# Patient Record
Sex: FEMALE | Race: WHITE | HISPANIC OR LATINO | Employment: UNEMPLOYED | URBAN - METROPOLITAN AREA
[De-identification: names, ages, dates, MRNs, and addresses within clinical notes are randomized per-mention and may not be internally consistent; named-entity substitution may affect disease eponyms.]

---

## 2017-09-13 ENCOUNTER — NON-PROVIDER VISIT (OUTPATIENT)
Dept: URGENT CARE | Facility: PHYSICIAN GROUP | Age: 37
End: 2017-09-13

## 2017-09-13 DIAGNOSIS — Z02.1 PRE-EMPLOYMENT DRUG SCREENING: ICD-10-CM

## 2017-09-13 LAB
AMP AMPHETAMINE: NORMAL
COC COCAINE: NORMAL
INT CON NEG: NEGATIVE
INT CON POS: POSITIVE
MET METHAMPHETAMINES: NORMAL
OPI OPIATES: NORMAL
PCP PHENCYCLIDINE: NORMAL
POC DRUG COMMENT 753798-OCCUPATIONAL HEALTH: NORMAL
THC: NORMAL

## 2017-09-13 PROCEDURE — 80305 DRUG TEST PRSMV DIR OPT OBS: CPT | Performed by: PHYSICIAN ASSISTANT

## 2017-11-03 ENCOUNTER — HOSPITAL ENCOUNTER (EMERGENCY)
Facility: MEDICAL CENTER | Age: 37
End: 2017-11-03
Attending: EMERGENCY MEDICINE
Payer: MEDICAID

## 2017-11-03 ENCOUNTER — HOSPITAL ENCOUNTER (EMERGENCY)
Facility: MEDICAL CENTER | Age: 37
End: 2017-11-03
Payer: MEDICAID

## 2017-11-03 ENCOUNTER — APPOINTMENT (OUTPATIENT)
Dept: RADIOLOGY | Facility: MEDICAL CENTER | Age: 37
End: 2017-11-03
Payer: MEDICAID

## 2017-11-03 ENCOUNTER — APPOINTMENT (OUTPATIENT)
Dept: RADIOLOGY | Facility: MEDICAL CENTER | Age: 37
End: 2017-11-03
Attending: EMERGENCY MEDICINE
Payer: MEDICAID

## 2017-11-03 VITALS
HEIGHT: 62 IN | SYSTOLIC BLOOD PRESSURE: 134 MMHG | WEIGHT: 154.54 LBS | OXYGEN SATURATION: 99 % | RESPIRATION RATE: 18 BRPM | BODY MASS INDEX: 28.44 KG/M2 | TEMPERATURE: 96.7 F | HEART RATE: 87 BPM | DIASTOLIC BLOOD PRESSURE: 80 MMHG

## 2017-11-03 VITALS
TEMPERATURE: 97.2 F | SYSTOLIC BLOOD PRESSURE: 124 MMHG | HEART RATE: 89 BPM | WEIGHT: 154.54 LBS | RESPIRATION RATE: 14 BRPM | BODY MASS INDEX: 28.27 KG/M2 | DIASTOLIC BLOOD PRESSURE: 76 MMHG | OXYGEN SATURATION: 100 %

## 2017-11-03 DIAGNOSIS — M25.512 ACUTE PAIN OF LEFT SHOULDER: ICD-10-CM

## 2017-11-03 LAB
ALBUMIN SERPL BCP-MCNC: 4 G/DL (ref 3.2–4.9)
ALBUMIN/GLOB SERPL: 1.3 G/DL
ALP SERPL-CCNC: 76 U/L (ref 30–99)
ALT SERPL-CCNC: 7 U/L (ref 2–50)
ANION GAP SERPL CALC-SCNC: 8 MMOL/L (ref 0–11.9)
APTT PPP: 24.1 SEC (ref 24.7–36)
AST SERPL-CCNC: 9 U/L (ref 12–45)
BASOPHILS # BLD AUTO: 0.6 % (ref 0–1.8)
BASOPHILS # BLD: 0.04 K/UL (ref 0–0.12)
BILIRUB SERPL-MCNC: 0.5 MG/DL (ref 0.1–1.5)
BNP SERPL-MCNC: <2 PG/ML (ref 0–100)
BUN SERPL-MCNC: 12 MG/DL (ref 8–22)
CALCIUM SERPL-MCNC: 9.4 MG/DL (ref 8.5–10.5)
CHLORIDE SERPL-SCNC: 100 MMOL/L (ref 96–112)
CO2 SERPL-SCNC: 26 MMOL/L (ref 20–33)
CREAT SERPL-MCNC: 0.61 MG/DL (ref 0.5–1.4)
EKG IMPRESSION: NORMAL
EKG IMPRESSION: NORMAL
EOSINOPHIL # BLD AUTO: 0.09 K/UL (ref 0–0.51)
EOSINOPHIL NFR BLD: 1.4 % (ref 0–6.9)
ERYTHROCYTE [DISTWIDTH] IN BLOOD BY AUTOMATED COUNT: 39.2 FL (ref 35.9–50)
GFR SERPL CREATININE-BSD FRML MDRD: >60 ML/MIN/1.73 M 2
GLOBULIN SER CALC-MCNC: 3.1 G/DL (ref 1.9–3.5)
GLUCOSE SERPL-MCNC: 427 MG/DL (ref 65–99)
HCT VFR BLD AUTO: 45.4 % (ref 37–47)
HGB BLD-MCNC: 16.3 G/DL (ref 12–16)
IMM GRANULOCYTES # BLD AUTO: 0.01 K/UL (ref 0–0.11)
IMM GRANULOCYTES NFR BLD AUTO: 0.2 % (ref 0–0.9)
INR PPP: 1 (ref 0.87–1.13)
LIPASE SERPL-CCNC: 33 U/L (ref 11–82)
LYMPHOCYTES # BLD AUTO: 1.79 K/UL (ref 1–4.8)
LYMPHOCYTES NFR BLD: 27.3 % (ref 22–41)
MCH RBC QN AUTO: 30.2 PG (ref 27–33)
MCHC RBC AUTO-ENTMCNC: 35.9 G/DL (ref 33.6–35)
MCV RBC AUTO: 84.1 FL (ref 81.4–97.8)
MONOCYTES # BLD AUTO: 0.29 K/UL (ref 0–0.85)
MONOCYTES NFR BLD AUTO: 4.4 % (ref 0–13.4)
NEUTROPHILS # BLD AUTO: 4.34 K/UL (ref 2–7.15)
NEUTROPHILS NFR BLD: 66.1 % (ref 44–72)
NRBC # BLD AUTO: 0 K/UL
NRBC BLD AUTO-RTO: 0 /100 WBC
PLATELET # BLD AUTO: 276 K/UL (ref 164–446)
PMV BLD AUTO: 11.5 FL (ref 9–12.9)
POTASSIUM SERPL-SCNC: 3.8 MMOL/L (ref 3.6–5.5)
PROT SERPL-MCNC: 7.1 G/DL (ref 6–8.2)
PROTHROMBIN TIME: 12.9 SEC (ref 12–14.6)
RBC # BLD AUTO: 5.4 M/UL (ref 4.2–5.4)
SODIUM SERPL-SCNC: 134 MMOL/L (ref 135–145)
TROPONIN I SERPL-MCNC: <0.01 NG/ML (ref 0–0.04)
WBC # BLD AUTO: 6.6 K/UL (ref 4.8–10.8)

## 2017-11-03 PROCEDURE — 99284 EMERGENCY DEPT VISIT MOD MDM: CPT

## 2017-11-03 PROCEDURE — 71010 DX-CHEST-LIMITED (1 VIEW): CPT

## 2017-11-03 PROCEDURE — 85025 COMPLETE CBC W/AUTO DIFF WBC: CPT

## 2017-11-03 PROCEDURE — 83690 ASSAY OF LIPASE: CPT

## 2017-11-03 PROCEDURE — 93005 ELECTROCARDIOGRAM TRACING: CPT

## 2017-11-03 PROCEDURE — 83880 ASSAY OF NATRIURETIC PEPTIDE: CPT

## 2017-11-03 PROCEDURE — 96372 THER/PROPH/DIAG INJ SC/IM: CPT

## 2017-11-03 PROCEDURE — 302449 STATCHG TRIAGE ONLY (STATISTIC)

## 2017-11-03 PROCEDURE — 80053 COMPREHEN METABOLIC PANEL: CPT

## 2017-11-03 PROCEDURE — 85730 THROMBOPLASTIN TIME PARTIAL: CPT

## 2017-11-03 PROCEDURE — 700111 HCHG RX REV CODE 636 W/ 250 OVERRIDE (IP): Performed by: EMERGENCY MEDICINE

## 2017-11-03 PROCEDURE — 85610 PROTHROMBIN TIME: CPT

## 2017-11-03 PROCEDURE — 36415 COLL VENOUS BLD VENIPUNCTURE: CPT

## 2017-11-03 PROCEDURE — 73030 X-RAY EXAM OF SHOULDER: CPT | Mod: LT

## 2017-11-03 PROCEDURE — 84484 ASSAY OF TROPONIN QUANT: CPT

## 2017-11-03 RX ORDER — KETOROLAC TROMETHAMINE 30 MG/ML
30 INJECTION, SOLUTION INTRAMUSCULAR; INTRAVENOUS ONCE
Status: COMPLETED | OUTPATIENT
Start: 2017-11-03 | End: 2017-11-03

## 2017-11-03 RX ORDER — NAPROXEN 500 MG/1
500 TABLET ORAL 2 TIMES DAILY WITH MEALS
Qty: 30 TAB | Refills: 0 | Status: SHIPPED | OUTPATIENT
Start: 2017-11-03 | End: 2018-01-22

## 2017-11-03 RX ORDER — INSULIN GLARGINE 100 [IU]/ML
40 INJECTION, SOLUTION SUBCUTANEOUS NIGHTLY
Status: SHIPPED | COMMUNITY
End: 2018-01-22 | Stop reason: SDUPTHER

## 2017-11-03 RX ADMIN — KETOROLAC TROMETHAMINE 30 MG: 30 INJECTION, SOLUTION INTRAMUSCULAR at 23:16

## 2017-11-03 ASSESSMENT — PAIN SCALES - GENERAL
PAINLEVEL_OUTOF10: 3
PAINLEVEL_OUTOF10: 4

## 2017-11-04 NOTE — ED PROVIDER NOTES
ED Provider Note    ED Provider Note    Primary care provider: Pcp Pt States None  Means of arrival: POV  History obtained from: Patient    CHIEF COMPLAINT  Chief Complaint   Patient presents with   • Chest Pain     Seen at 10:16 PM.   HPI  Bettina Burdick is a 36 y.o. female who presents to the Emergency DepartmentWith persistent left shoulder and chest pain that began insidiously 36 hours ago. She states that initially was intermittent but has been quite persistent over the past 12 hours. The pain is described as sharp, beginning in the left shoulder and radiating into the left chest. It appears be worse with arm movement.  There is some mild dyspnea associated with the pain. She denies any fevers, chills, shoulder trauma, recent new activities, hemoptysis, cough, nausea, vomiting, dyspnea on exertion, prolonged immobility, recent surgery, history of DVT or hemoptysis. The patient does take OCPs. She reports a sensation of a heavy left upper extremity earlier in the week that resolved spontaneously after approximately 1 day.    She was evaluated at triage and now regional, the patient had laboratory evaluation and a portable chest x-ray taken there. She has not seen by a physician and left secondary to the prolonged wait.    REVIEW OF SYSTEMS  See HPI,   Remainder of ROS negative.     PAST MEDICAL HISTORY   has a past medical history of Diabetes; DIABETES MELLITUS (1/13/2012); and Infectious disease.    SURGICAL HISTORY   has a past surgical history that includes other; other abdominal surgery; and cholecystectomy.    SOCIAL HISTORY  Social History   Substance Use Topics   • Smoking status: Never Smoker   • Smokeless tobacco: Never Used   • Alcohol use Yes      Comment: occ      History   Drug Use No       FAMILY HISTORY  Family History   Problem Relation Age of Onset   • Diabetes Mother    • Diabetes Maternal Grandmother    • Diabetes Maternal Grandfather    • Heart Disease Neg Hx    • Hypertension Neg  "Hx    • Hyperlipidemia Neg Hx    • Stroke Neg Hx    • Cancer Neg Hx        CURRENT MEDICATIONS  Reviewed.  See Encounter Summary.     ALLERGIES  No Known Allergies    PHYSICAL EXAM  VITAL SIGNS: /83   Pulse 96   Temp 35.9 °C (96.7 °F)   Resp 20   Ht 1.575 m (5' 2\")   Wt 70.1 kg (154 lb 8.7 oz)   SpO2 98%   BMI 28.27 kg/m²   Constitutional: Awake, alert in no apparent distress.  HENT: Normocephalic, Bilateral external ears normal. Nose normal.   Eyes: Conjunctiva normal, non-icteric, EOMI.    Thorax & Lungs: Easy unlabored respirations, Clear to ascultation bilaterally.  Cardiovascular: Regular rate, Regular rhythm, No murmurs, rubs or gallops.Radial pulse 2+ bilaterally.  Abdomen:  Soft, nontender, nondistended, normal active bowel sounds.   :    Skin: Visualized skin is  Dry, No erythema, No rash.   Musculoskeletal:Left shoulder: Abduction of the shoulder girdle clearly reproduces the patient's sharp shoulder pain. She also has some tenderness over the palpation of the glenohumeral joint. She does have full range of motion, negative Bryant, negative apprehension test, negative Apley scratch  Neurologic: Alert, Grossly non-focal. Sensation intact to light touch, full strength upper extremities.  Psychiatric: Normal affect, Normal mood  Lymphatic:  No cervical LAD    EKG   12 lead Interpreted by me  Rhythm:  Normal sinus rhythm   Rate: 84  Axis: normal  Ectopy: none  Conduction: normal  ST Segments: no acute change  T Waves: no acute change  Clinical Impression: Normal EKG without acute changes     RADIOLOGY  DX-SHOULDER 2+ LEFT   Final Result      No fracture or dislocation of LEFT shoulder.      DX-CHEST-LIMITED (1 VIEW)   Final Result      No acute cardiopulmonary disease.        The radiologist's interpretation of all radiological studies have been reviewed by me.    COURSE & MEDICAL DECISION MAKING  Pertinent Labs & Imaging studies reviewed. (See chart for details)        10:16 PM - Patient seen " and examined at bedside.    11:39 PM - Pain resolved after Toradol.    Decision Making:  This is a 36 y.o. year old female who presents withLeft shoulder pain radiating to the left chest. The examination shows that the pain is clearly isolated to the left shoulder and appears to be muscle skeletal etiology. Baseline examination, I do not suspect a cardiac or pulmonary source. The patient would be PERC negative however she is on OCPs. I do not feel that pulmonary embolus is likely given the presentation today. Laboratory evaluation is normal. The patient has a undetectable troponin despite prolonged symptoms. I do not feel that acute coronary syndrome is likely given the reproducible nature of the pain. I do not feel that a 2nd troponin or d-dimer are indicated today.   With regards to the shoulder, x-rays are unremarkable, there is no occult fracture. The patient does not have any signs concerning for septic joint. She is neurovascularly intact.    The etiology of the patient's pain is unclear though it does appear to be most likely an inflammatory condition. The pain resolved with Toradol. I do recommend she take anti-inflammatories for the next few days. If the pain becomes worse, she should follow-up with her primary care physician. If she has any severe chest pain or shortness of breath, she should return the emergency department.    The patient's blood pressure is elevated today. >120/80. I have referred them to primary care for follow up.       Discharge Medications:  New Prescriptions    NAPROXEN (NAPROSYN) 500 MG TAB    Take 1 Tab by mouth 2 times a day, with meals.       The patient will be discharged home in stable condition.    FINAL IMPRESSION  1. Acute pain of left shoulder

## 2017-11-04 NOTE — ED NOTES
Chief Complaint   Patient presents with   • Chest Pain     left sided, radiates to left arm.      Radiates to left shoulder.   Pt reports that tylenol was ineffective.   Blood pressure 124/76, pulse 89, temperature 36.2 °C (97.2 °F), resp. rate 14, weight 70.1 kg (154 lb 8.7 oz), SpO2 100 %.    Pt informed of wait times. Educated on triage process.  Asked to return to triage RN for any new or worsening of symptoms. Thanked for patience.

## 2017-11-04 NOTE — ED NOTES
Pt bib family with c/o of chest pain that radiates to her left arm. Pt states she took tylenol earlier today which helped alleviate the pain, but now it's back. Reports mild difficulty breathing and shortness or breath.   EKG performed- No STEMI

## 2017-11-04 NOTE — ED NOTES
Pt was triaged at [facility] early this evening, labs, radiology and EKG were completed. Pt was waiting for so long they left [facility] and came here for treatment.    Pt is A & O, privacy, gowned, plan of care & support given. Family at bedside. EKG completed and given to ERP.

## 2017-11-04 NOTE — DISCHARGE INSTRUCTIONS
Shoulder Pain  The shoulder is the joint that connects your arms to your body. The bones that form the shoulder joint include the upper arm bone (humerus), the shoulder blade (scapula), and the collarbone (clavicle). The top of the humerus is shaped like a ball and fits into a rather flat socket on the scapula (glenoid cavity). A combination of muscles and strong, fibrous tissues that connect muscles to bones (tendons) support your shoulder joint and hold the ball in the socket. Small, fluid-filled sacs (bursae) are located in different areas of the joint. They act as cushions between the bones and the overlying soft tissues and help reduce friction between the gliding tendons and the bone as you move your arm. Your shoulder joint allows a wide range of motion in your arm. This range of motion allows you to do things like scratch your back or throw a ball. However, this range of motion also makes your shoulder more prone to pain from overuse and injury.  Causes of shoulder pain can originate from both injury and overuse and usually can be grouped in the following four categories:  · Redness, swelling, and pain (inflammation) of the tendon (tendinitis) or the bursae (bursitis).  · Instability, such as a dislocation of the joint.  · Inflammation of the joint (arthritis).  · Broken bone (fracture).  HOME CARE INSTRUCTIONS   · Apply ice to the sore area.  ¨ Put ice in a plastic bag.  ¨ Place a towel between your skin and the bag.  ¨ Leave the ice on for 15-20 minutes, 3-4 times per day for the first 2 days, or as directed by your health care provider.  · Stop using cold packs if they do not help with the pain.  · If you have a shoulder sling or immobilizer, wear it as long as your caregiver instructs. Only remove it to shower or bathe. Move your arm as little as possible, but keep your hand moving to prevent swelling.  · Squeeze a soft ball or foam pad as much as possible to help prevent swelling.  · Only take  over-the-counter or prescription medicines for pain, discomfort, or fever as directed by your caregiver.  SEEK MEDICAL CARE IF:   · Your shoulder pain increases, or new pain develops in your arm, hand, or fingers.  · Your hand or fingers become cold and numb.  · Your pain is not relieved with medicines.  SEEK IMMEDIATE MEDICAL CARE IF:   · Your arm, hand, or fingers are numb or tingling.  · Your arm, hand, or fingers are significantly swollen or turn white or blue.  MAKE SURE YOU:   · Understand these instructions.  · Will watch your condition.  · Will get help right away if you are not doing well or get worse.     This information is not intended to replace advice given to you by your health care provider. Make sure you discuss any questions you have with your health care provider.     Document Released: 09/27/2006 Document Revised: 01/08/2016 Document Reviewed: 04/11/2016  [employer] Interactive Patient Education ©2016 [employer]

## 2017-11-04 NOTE — ED NOTES
"Pt stated \"I am going to [facility]\" and was told that she had been assigned a room. Pt stated \"I am going anyway I have waited too long.\" Pt will will be removed from the system.   "

## 2018-01-15 ENCOUNTER — OFFICE VISIT (OUTPATIENT)
Dept: INTERNAL MEDICINE | Facility: MEDICAL CENTER | Age: 38
End: 2018-01-15
Payer: MEDICAID

## 2018-01-15 VITALS
HEIGHT: 62 IN | RESPIRATION RATE: 17 BRPM | BODY MASS INDEX: 27.16 KG/M2 | SYSTOLIC BLOOD PRESSURE: 106 MMHG | HEART RATE: 88 BPM | WEIGHT: 147.6 LBS | OXYGEN SATURATION: 96 % | TEMPERATURE: 97.2 F | DIASTOLIC BLOOD PRESSURE: 74 MMHG

## 2018-01-15 DIAGNOSIS — Z79.4 TYPE 2 DIABETES MELLITUS WITH COMPLICATION, WITH LONG-TERM CURRENT USE OF INSULIN (HCC): ICD-10-CM

## 2018-01-15 DIAGNOSIS — E11.8 TYPE 2 DIABETES MELLITUS WITH COMPLICATION, WITH LONG-TERM CURRENT USE OF INSULIN (HCC): ICD-10-CM

## 2018-01-15 ASSESSMENT — PATIENT HEALTH QUESTIONNAIRE - PHQ9: CLINICAL INTERPRETATION OF PHQ2 SCORE: 0

## 2018-01-15 ASSESSMENT — PAIN SCALES - GENERAL: PAINLEVEL: NO PAIN

## 2018-01-15 NOTE — PROGRESS NOTES
1/15/2018  Progress Note: Patient left without seeing Doctor      Bettina David Burdick is a 36 y.o. female who presented to our clinic as a new patient first time to be seen and evaluated for her Diabetes management.    She was roomed in initially by the MA who had recorded the vitals, however I was busy presenting to my Preceptor Dr Moralez evaluating another patient ahead of her, who needed Narcotics prescriptions. By the time we  completed our former patient Ms Burdick left the clinic without seeing us. She stated to the  she can not wait long.    I tried calling the patient next morning (01/16/2018) to enquire how she is doing and if she wants to reschedule the appointment. However she did not  the phone.      This visit will be counted as Cancelled Visit and patient will not be charged.

## 2018-01-16 NOTE — PATIENT INSTRUCTIONS
Patient left without seeing the Doctor on 01/15/2018 afternoon session  Cancelled the Visit  No charge.

## 2018-01-22 ENCOUNTER — OFFICE VISIT (OUTPATIENT)
Dept: MEDICAL GROUP | Facility: MEDICAL CENTER | Age: 38
End: 2018-01-22
Attending: STUDENT IN AN ORGANIZED HEALTH CARE EDUCATION/TRAINING PROGRAM
Payer: MEDICAID

## 2018-01-22 VITALS
BODY MASS INDEX: 28.16 KG/M2 | SYSTOLIC BLOOD PRESSURE: 120 MMHG | HEIGHT: 62 IN | TEMPERATURE: 97 F | RESPIRATION RATE: 16 BRPM | DIASTOLIC BLOOD PRESSURE: 80 MMHG | HEART RATE: 100 BPM | OXYGEN SATURATION: 98 % | WEIGHT: 153 LBS

## 2018-01-22 DIAGNOSIS — Z79.4 TYPE 2 DIABETES MELLITUS WITHOUT COMPLICATION, WITH LONG-TERM CURRENT USE OF INSULIN (HCC): ICD-10-CM

## 2018-01-22 DIAGNOSIS — E11.9 TYPE 2 DIABETES MELLITUS WITHOUT COMPLICATION, WITH LONG-TERM CURRENT USE OF INSULIN (HCC): ICD-10-CM

## 2018-01-22 DIAGNOSIS — R19.7 DIARRHEA, UNSPECIFIED TYPE: ICD-10-CM

## 2018-01-22 DIAGNOSIS — Z23 NEED FOR 23-POLYVALENT PNEUMOCOCCAL POLYSACCHARIDE VACCINE: ICD-10-CM

## 2018-01-22 DIAGNOSIS — Z87.19 HISTORY OF LIVER DISEASE: ICD-10-CM

## 2018-01-22 DIAGNOSIS — Z23 NEED FOR TDAP VACCINATION: ICD-10-CM

## 2018-01-22 DIAGNOSIS — L29.9 ITCHING: ICD-10-CM

## 2018-01-22 DIAGNOSIS — Z13.29 SCREENING FOR THYROID DISORDER: ICD-10-CM

## 2018-01-22 DIAGNOSIS — Z91.89 AT RISK FOR IMPAIRED DIGESTION: ICD-10-CM

## 2018-01-22 PROCEDURE — 90732 PPSV23 VACC 2 YRS+ SUBQ/IM: CPT | Performed by: NURSE PRACTITIONER

## 2018-01-22 PROCEDURE — 99203 OFFICE O/P NEW LOW 30 MIN: CPT | Mod: 25 | Performed by: NURSE PRACTITIONER

## 2018-01-22 PROCEDURE — 90471 IMMUNIZATION ADMIN: CPT | Performed by: NURSE PRACTITIONER

## 2018-01-22 PROCEDURE — 99204 OFFICE O/P NEW MOD 45 MIN: CPT | Performed by: NURSE PRACTITIONER

## 2018-01-22 PROCEDURE — 90715 TDAP VACCINE 7 YRS/> IM: CPT | Performed by: NURSE PRACTITIONER

## 2018-01-22 RX ORDER — BLOOD-GLUCOSE METER
1 EACH MISCELLANEOUS
Qty: 1 DEVICE | Refills: 0 | Status: SHIPPED | OUTPATIENT
Start: 2018-01-22 | End: 2018-03-26

## 2018-01-22 RX ORDER — LANCETS 30 GAUGE
EACH MISCELLANEOUS
Qty: 100 EACH | Refills: 2 | Status: SHIPPED | OUTPATIENT
Start: 2018-01-22 | End: 2018-03-26 | Stop reason: SDUPTHER

## 2018-01-22 RX ORDER — INSULIN GLARGINE 100 [IU]/ML
INJECTION, SOLUTION SUBCUTANEOUS
Qty: 20 VIAL | Refills: 2 | Status: SHIPPED | OUTPATIENT
Start: 2018-01-22 | End: 2018-01-30

## 2018-01-22 RX ORDER — METOCLOPRAMIDE 5 MG/1
5 TABLET ORAL 4 TIMES DAILY
Qty: 56 TAB | Refills: 0 | Status: SHIPPED | OUTPATIENT
Start: 2018-01-22 | End: 2018-03-26 | Stop reason: SDUPTHER

## 2018-01-22 ASSESSMENT — PAIN SCALES - GENERAL: PAINLEVEL: NO PAIN

## 2018-01-23 NOTE — ASSESSMENT & PLAN NOTE
Pt reports after eating has to go to [place] and has to have stools  Some undigested foods. Has had poorly controlled BS  We discussed possible Gastroparesis

## 2018-01-23 NOTE — ASSESSMENT & PLAN NOTE
Pt reports itching to top of head in hair with losing hair.  Thinner and thinks stress may be involved.

## 2018-01-23 NOTE — PROGRESS NOTES
Bettina presents to the clinic to establish as New Patient    Her PMH includes    DM  Liver Disease  Chest Pain/Left Shoulder Pain w movement    Nev  Report:  No Entries    Review of Records:  1/15/17 [facility] Appt but left prior to being seen by MD  11/3/17 ER for Chest pain, Left Shoulder Pain w movement.   EKG nsr, no acute changes, Left Shoulder Xray negative, Neg Trop, CBC normal,  CMp normal except BS= 427,   Cxr negative.    Chief Complaint: New Patient, itchy scalp, DM    HPI:      Type 2 diabetes mellitus without complication (CMS-HCC)  Pt reports 4 yrs of DM-2. Was on Metformin 100 mg BID and Lantus INsulin  40 untis at night.  Fasting AM BS have been 250-300.   States has been under a lot of Stress recently with  Dad being ill and  in Dec.  Does not have own meter.      History of liver disease  Pt reportedly has hx of Fatty Liver.  Denies current concerns.    Itching  Pt reports itching to top of head in hair with losing hair.  Thinner and thinks stress may be involved.      At risk for impaired digestion  Pt reports after eating has to go to [place] and has to have stools  Some undigested foods. Has had poorly controlled BS  We discussed possible Gastroparesis      Patient Active Problem List    Diagnosis Date Noted   • Itching 2018   • At risk for impaired digestion 2018   • Type 2 diabetes mellitus without complication (CMS-HCC) 2012   • History of liver disease 2012       Allergies:Septra [sulfamethoxazole w-trimethoprim]    Current Outpatient Prescriptions   Medication Sig Dispense Refill   • metoclopramide (REGLAN) 5 MG tablet Take 1 Tab by mouth 4 times a day. 56 Tab 0   • insulin glargine (LANTUS) 100 UNIT/ML Solution 10 units in morning, 40 units at night 20 Vial 2   • Lancets Misc Lancets for use checking BS Twice daily 100 Each 2   • Blood Glucose Monitoring Suppl (TRUE METRIX AIR GLUCOSE METER) Device 1 Each by Does not apply route 1 time daily as needed. 1 Device 0   •  "glucose blood (TRUE METRIX BLOOD GLUCOSE TEST) strip 1 Strip by Other route 2 Times a Day. 100 Strip 2   • metformin (GLUCOPHAGE) 1000 MG tablet Take 1 Tab by mouth 2 times a day, with meals. 60 Tab 3     No current facility-administered medications for this visit.        Social History   Substance Use Topics   • Smoking status: Never Smoker   • Smokeless tobacco: Never Used   • Alcohol use Yes      Comment: occ       Family History   Problem Relation Age of Onset   • Diabetes Mother    • Diabetes Maternal Grandmother    • Diabetes Maternal Grandfather    • Heart Disease Neg Hx    • Hypertension Neg Hx    • Hyperlipidemia Neg Hx    • Stroke Neg Hx    • Cancer Neg Hx        ROS:  Review of Systems   See HPI Above        Exam:  Blood pressure 120/80, pulse 100, temperature 36.1 °C (97 °F), resp. rate 16, height 1.581 m (5' 2.25\"), weight 69.4 kg (153 lb), last menstrual period 01/10/2018, SpO2 98 %.  General:  Well nourished, well developed female in NAD  HENT:Head is grossly normal. PERRL.  Neck: Supple. Trachea is midline.  Pulmonary: Clear to ausculation .  Normal effort. No rales, ronchi, or wheezing.   Cardiovascular: Regular rate and rhythm.  Abdomen-Abdomen is soft, No tenderness.  Upper extremities- Strong = . Good ROM  Lower extremities- neg for edema, redness, tenderness.  Neuro- A & O x 4. Speech clear and appropriate.     Current medications, allergies, and problem list reviewed with patient and updated in  [facility] today.    Assessment/Plan:  1. Need for 23-polyvalent pneumococcal polysaccharide vaccine  Pneumococal Polysaccharide Vaccine 23-Valent =>1yo SQ/IM   2. Need for Tdap vaccination  TDAP VACCINE =>6YO IM   3. History of liver disease  LFt's in CMP test   4. Type 2 diabetes mellitus without complication, with long-term current use of insulin (CMS-[facility])  COMP METABOLIC PANEL    HEMOGLOBIN A1C    LIPID PROFILE    MICROALBUMIN CREAT RATIO URINE (LAB COLLECT)    insulin glargine (LANTUS) 100 UNIT/ML " Solution  10 units in am and 40 units pm w night dose Increase lantus 2 units every 2 days if BS maintaining >200. Stop titration if having episodes of hypoglycemia.  Metformin  1000 mg BID RX    Lancets Misc    Blood Glucose Monitoring Suppl (TRUE METRIX AIR GLUCOSE METER) Device    glucose blood (TRUE METRIX BLOOD GLUCOSE TEST) strip   5. Itching  Pt to observe for foods or soaps that may be causing this, check for hypothyroid  Pt to work on reducing stress in life   6. Screening for thyroid disorder  TSH   7. At risk for impaired digestion  metoclopramide (REGLAN) 5 MG tablet   8. Diarrhea, unspecified type  metoclopramide (REGLAN) 5 MG tablet   Follow up in 3 weeks for lab review. Call or return if questions, concerns, or worsening condition.

## 2018-01-29 DIAGNOSIS — E11.9 TYPE 2 DIABETES MELLITUS WITHOUT COMPLICATION, WITH LONG-TERM CURRENT USE OF INSULIN (HCC): ICD-10-CM

## 2018-01-29 DIAGNOSIS — Z79.4 TYPE 2 DIABETES MELLITUS WITHOUT COMPLICATION, WITH LONG-TERM CURRENT USE OF INSULIN (HCC): ICD-10-CM

## 2018-01-29 RX ORDER — BLOOD-GLUCOSE METER
1 EACH MISCELLANEOUS 2 TIMES DAILY
Qty: 1 DEVICE | Refills: 0 | Status: SHIPPED | OUTPATIENT
Start: 2018-01-29 | End: 2018-03-26

## 2018-01-30 DIAGNOSIS — Z79.4 TYPE 2 DIABETES MELLITUS WITHOUT COMPLICATION, WITH LONG-TERM CURRENT USE OF INSULIN (HCC): ICD-10-CM

## 2018-01-30 DIAGNOSIS — E11.9 TYPE 2 DIABETES MELLITUS WITHOUT COMPLICATION, WITH LONG-TERM CURRENT USE OF INSULIN (HCC): ICD-10-CM

## 2018-01-30 RX ORDER — PEN NEEDLE, DIABETIC 29 G X1/2"
NEEDLE, DISPOSABLE MISCELLANEOUS
Qty: 100 EACH | Refills: 2 | Status: SHIPPED | OUTPATIENT
Start: 2018-01-30 | End: 2018-03-26 | Stop reason: SDUPTHER

## 2018-02-06 ENCOUNTER — OFFICE VISIT (OUTPATIENT)
Dept: MEDICAL GROUP | Facility: MEDICAL CENTER | Age: 38
End: 2018-02-06
Attending: NURSE PRACTITIONER
Payer: MEDICAID

## 2018-02-06 VITALS
HEART RATE: 72 BPM | RESPIRATION RATE: 16 BRPM | HEIGHT: 62 IN | DIASTOLIC BLOOD PRESSURE: 60 MMHG | BODY MASS INDEX: 27.97 KG/M2 | WEIGHT: 152 LBS | SYSTOLIC BLOOD PRESSURE: 92 MMHG | OXYGEN SATURATION: 99 % | TEMPERATURE: 97.2 F

## 2018-02-06 DIAGNOSIS — R68.89 NECK PROBLEM: ICD-10-CM

## 2018-02-06 DIAGNOSIS — J02.9 PHARYNGITIS, UNSPECIFIED ETIOLOGY: ICD-10-CM

## 2018-02-06 PROCEDURE — 99214 OFFICE O/P EST MOD 30 MIN: CPT | Performed by: NURSE PRACTITIONER

## 2018-02-06 PROCEDURE — 99213 OFFICE O/P EST LOW 20 MIN: CPT | Performed by: NURSE PRACTITIONER

## 2018-02-06 RX ORDER — BENZONATATE 100 MG/1
100 CAPSULE ORAL 3 TIMES DAILY PRN
Qty: 30 CAP | Refills: 0 | Status: SHIPPED | OUTPATIENT
Start: 2018-02-06 | End: 2018-02-12

## 2018-02-06 RX ORDER — AMOXICILLIN 500 MG/1
500 CAPSULE ORAL 3 TIMES DAILY
Qty: 30 CAP | Refills: 0 | Status: SHIPPED | OUTPATIENT
Start: 2018-02-06 | End: 2020-09-30

## 2018-02-06 RX ORDER — IBUPROFEN 800 MG/1
400 TABLET ORAL EVERY 12 HOURS PRN
Qty: 30 TAB | Refills: 0 | Status: SHIPPED | OUTPATIENT
Start: 2018-02-06 | End: 2018-03-26

## 2018-02-06 ASSESSMENT — PAIN SCALES - GENERAL: PAINLEVEL: 8=MODERATE-SEVERE PAIN

## 2018-02-06 NOTE — PROGRESS NOTES
"Bettina presents to the clinic as same day appointment for throat discomfort.    Her PMH includes:  DM-2  Liver Disease  PID infection  Chest Pain/Left Shoulder Pain w movement  Risk for impaired digestion    Nev  Report:  No Entries     Review of Records:    1/22/18 Clinic New Patient appt. Labs ordered, Med Refills, RX for Reglan for trial, to f/u w lab results in 3 wks.    1/15/17 [facility] Appt but left prior to being seen by MD  11/3/17 ER for Chest pain, Left Shoulder Pain w movement.   EKG nsr, no acute changes, Left Shoulder Xray negative, Neg Trop, CBC normal,  CMp normal except BS= 427, Cxr negative.    Chief Complaint: Neck \"bump\" and sore throat.    HPI:      Neck problem/Sore Throat  Bettina reports at noon yesterday she felt a sore throat.  By evening it hurt to swallow. Then had \"bump\" on right neck.  States \"it hurts to talk\". Assoc slight post nasal drip, Denies cough.  Feels \"like I am getting sick\".  Denies any SOB. Is taking fluids.   Reports son recently dx w Strep Throat and she mistakenly drank  From his glass a few days ago.    Has not tried any medications.         Patient Active Problem List    Diagnosis Date Noted   • Neck problem 02/06/2018   • Itching 01/22/2018   • At risk for impaired digestion 01/22/2018   • Type 2 diabetes mellitus without complication (CMS-HCC) 07/02/2012   • History of liver disease 01/13/2012       Allergies:Septra [sulfamethoxazole w-trimethoprim]    Current Outpatient Prescriptions   Medication Sig Dispense Refill   • amoxicillin (AMOXIL) 500 MG Cap Take 1 Cap by mouth 3 times a day. 30 Cap 0   • benzonatate (TESSALON) 100 MG Cap Take 1 Cap by mouth 3 times a day as needed (sore throat). 30 Cap 0   • ibuprofen (MOTRIN) 800 MG Tab Take 0.5 Tabs by mouth every 12 hours as needed. 30 Tab 0   • insulin glargine (BASAGLAR KWIKPEN) 100 UNIT/ML Solution Pen-injector injection 20 units in am and 40 units at night 5 PEN 2   • Insulin Pen Needle (PEN NEEDLES 29GX1/2\") " "29G X 12MM Misc Use with Insulin pen twice daily 100 Each 2   • Blood Glucose Monitoring Suppl (TRUE METRIX METER) Device 1 Each by Does not apply route 2 Times a Day. 1 Device 0   • glucose blood (TRUE METRIX BLOOD GLUCOSE TEST) strip Test twice daily as needed for DM-2 100 Strip 2   • metoclopramide (REGLAN) 5 MG tablet Take 1 Tab by mouth 4 times a day. 56 Tab 0   • Lancets Misc Lancets for use checking BS Twice daily 100 Each 2   • Blood Glucose Monitoring Suppl (TRUE METRIX AIR GLUCOSE METER) Device 1 Each by Does not apply route 1 time daily as needed. 1 Device 0   • glucose blood (TRUE METRIX BLOOD GLUCOSE TEST) strip 1 Strip by Other route 2 Times a Day. 100 Strip 2   • metformin (GLUCOPHAGE) 1000 MG tablet Take 1 Tab by mouth 2 times a day, with meals. 60 Tab 3     No current facility-administered medications for this visit.        Social History   Substance Use Topics   • Smoking status: Never Smoker   • Smokeless tobacco: Never Used   • Alcohol use Yes      Comment: occ       Family History   Problem Relation Age of Onset   • Diabetes Mother    • Diabetes Maternal Grandmother    • Diabetes Maternal Grandfather    • Heart Disease Neg Hx    • Hypertension Neg Hx    • Hyperlipidemia Neg Hx    • Stroke Neg Hx    • Cancer Neg Hx        ROS:  Review of Systems   See HPI Above        Exam:  Blood pressure (!) 92/60, pulse 72, temperature 36.2 °C (97.2 °F), resp. rate 16, height 1.581 m (5' 2.24\"), weight 68.9 kg (152 lb), last menstrual period 01/10/2018, SpO2 99 %.  General:  Well nourished, well developed female in moderate discomfort.  HENT:Head is grossly normal. PERRL. Ear canals clear and TMs normal. Posterior pharynx red, no exudates noted.             Able to swallow on command. Force is clear and non-muffled.  Neck: Supple. Trachea is midline.  Pulmonary: Clear to ausculation .  Normal effort. No rales, ronchi, or wheezing.   Cardiovascular: Regular rate and rhythm.  Abdomen-Abdomen is soft  Upper " extremities-Good ROM  Lower extremities- neg for edema, redness  Neuro- A & O x 4. Speech clear and appropriate.     Current medications, allergies, and problem list reviewed with patient and updated in  [facility] today.    Assessment/Plan:  1. Neck problem  ibuprofen (MOTRIN) 800 MG Tab 1/2 tab BID prn and take with food.   2. Pharyngitis, unspecified etiology  amoxicillin (AMOXIL) 500 MG Cap    benzonatate (TESSALON) 100 MG Cap TID  Gargle w salt water.  If any SOB or difficulty swallowing, worse swelling to go to ER.  Note off work 2/6 and 2/7.   Follow up in 7 days to review labs. Call or return if questions, concerns, or worsening condition.

## 2018-02-06 NOTE — ASSESSMENT & PLAN NOTE
"Bettina reports at noon yesterday she felt a sore throat.  By evening it hurt to swallow. Then had \"bump\" on right neck.  States \"it hurts to talk\". Assoc slight post nasal drip, Denies cough.  Feels \"like I am getting sick\".  Denies any SOB. Is taking fluids.   Reports son recently dx w Strep Throat and she mistakenly drank  From his glass a few days ago.    Has not tried any medications.     "

## 2018-02-06 NOTE — LETTER
February 6, 2018       Patient: Bettina Burdick   YOB: 1980   Date of Visit: 2/6/2018         To Whom It May Concern:    It is my medical opinion that Bettina Burdick should be excused from work 2/6 through 2/7/18 due to illness.  She may return after that to work.    If you have any questions or concerns, please don't hesitate to call [phone]          Sincerely,          JAMARI [email or web address].  Electronically Signed

## 2018-02-12 ENCOUNTER — OFFICE VISIT (OUTPATIENT)
Dept: MEDICAL GROUP | Facility: MEDICAL CENTER | Age: 38
End: 2018-02-12
Attending: NURSE PRACTITIONER
Payer: MEDICAID

## 2018-02-12 VITALS
HEART RATE: 72 BPM | SYSTOLIC BLOOD PRESSURE: 108 MMHG | BODY MASS INDEX: 27.97 KG/M2 | OXYGEN SATURATION: 98 % | WEIGHT: 152 LBS | HEIGHT: 62 IN | DIASTOLIC BLOOD PRESSURE: 68 MMHG | TEMPERATURE: 97.2 F | RESPIRATION RATE: 16 BRPM

## 2018-02-12 DIAGNOSIS — E11.9 TYPE 2 DIABETES MELLITUS WITHOUT COMPLICATION, WITH LONG-TERM CURRENT USE OF INSULIN (HCC): ICD-10-CM

## 2018-02-12 DIAGNOSIS — B37.31 VAGINAL YEAST INFECTION: ICD-10-CM

## 2018-02-12 DIAGNOSIS — B37.31 CANDIDAL VAGINITIS: ICD-10-CM

## 2018-02-12 DIAGNOSIS — Z79.4 TYPE 2 DIABETES MELLITUS WITHOUT COMPLICATION, WITH LONG-TERM CURRENT USE OF INSULIN (HCC): ICD-10-CM

## 2018-02-12 PROCEDURE — 99213 OFFICE O/P EST LOW 20 MIN: CPT | Performed by: NURSE PRACTITIONER

## 2018-02-12 RX ORDER — SIMVASTATIN 5 MG
5 TABLET ORAL EVERY EVENING
Qty: 30 TAB | Refills: 11 | Status: SHIPPED | OUTPATIENT
Start: 2018-02-12

## 2018-02-12 RX ORDER — FLUCONAZOLE 150 MG/1
TABLET ORAL
Qty: 2 TAB | Refills: 1 | Status: SHIPPED | OUTPATIENT
Start: 2018-02-12 | End: 2018-03-26

## 2018-02-12 RX ORDER — AMOXICILLIN 500 MG/1
CAPSULE ORAL
COMMUNITY
Start: 2018-02-06 | End: 2018-02-12

## 2018-02-13 NOTE — ASSESSMENT & PLAN NOTE
Pt reports recurrent yeast vaginitis.  Itching to area.  Asking for RX.  Discussed improving Glucose control will help as well.

## 2018-02-13 NOTE — PROGRESS NOTES
"Bettina presents to the clinic for Results.    Her PMH includes;  DM-2  Liver Disease  PID infection  Chest Pain/Left Shoulder Pain w movement  Risk for impaired digestion  Strep Throat Pharyngitis.     Nev  Report:  2/8/18 Norco 5/325 # 36, Jason Florentino MD     Review of Records:   2/6/18 Clinic visit for neck lump and sore throat, exposure to Strep Throat. Rx for motrin, Amoxil Tessalon, Work note.  To return in 1 wk to review labs.  1/22/18 Clinic New Patient appt. Labs ordered, Med Refills, RX for Reglan for trial, to f/u w lab results in 3 wks.     1/15/17 [facility] Appt but left prior to being seen by MD  11/3/17 ER for Chest pain, Left Shoulder Pain w movement.   EKG nsr, no acute changes, Left Shoulder Xray negative, Neg Trop, CBC normal,  CMp normal except BS= 427, Cxr negative.    Results Review;  2/9/18 A1c= 10.6 ( avg 257) CMP normal except BS(fasting) = 195, TSH= 0.91, Lipids normal except LDL= 118  Microalbumin = 31.    Chief Complaint: REsults    HPI:    \"yeast infection\"  Pt reports recurrent yeast vaginitis.  Itching to area.  Asking for RX.  Discussed improving Glucose control will help as well.    Type 2 diabetes mellitus without complication (CMS-HCC)  Was taking 40 u at Lantus at hs  And Metformin 1000 mg bid  But unable to get lantus from Insurance  Has been out about 2 weeks.      Patient Active Problem List    Diagnosis Date Noted   • Neck problem 02/06/2018   • Itching 01/22/2018   • At risk for impaired digestion 01/22/2018   • Type 2 diabetes mellitus without complication (CMS-HCC) 07/02/2012   • History of liver disease 01/13/2012       Allergies:Septra [sulfamethoxazole w-trimethoprim]    Current Outpatient Prescriptions   Medication Sig Dispense Refill   • simvastatin (ZOCOR) 5 MG Tab Take 1 Tab by mouth every evening. 30 Tab 11   • fluconazole (DIFLUCAN) 150 MG tablet Take one pill tonight, may repeat in 3 days if still has symptoms 2 Tab 1   • miconazole (MICOTIN) 2 % Cream Apply twice a " "day if symptoms 1 Tube 2   • Insulin Glargine (TOUJEO SOLOSTAR) 300 UNIT/ML Solution Pen-injector Inject 40 Units as instructed every bedtime. Increase Toujeo 2 units every 2 days if BS maintaining >200. Stop titration if having episodes of hypoglycemia. 10 PEN 2   • amoxicillin (AMOXIL) 500 MG Cap Take 1 Cap by mouth 3 times a day. 30 Cap 0   • ibuprofen (MOTRIN) 800 MG Tab Take 0.5 Tabs by mouth every 12 hours as needed. 30 Tab 0   • insulin glargine (BASAGLAR KWIKPEN) 100 UNIT/ML Solution Pen-injector injection 20 units in am and 40 units at night 5 PEN 2   • Insulin Pen Needle (PEN NEEDLES 29GX1/2\") 29G X 12MM Misc Use with Insulin pen twice daily 100 Each 2   • Blood Glucose Monitoring Suppl (TRUE METRIX METER) Device 1 Each by Does not apply route 2 Times a Day. 1 Device 0   • glucose blood (TRUE METRIX BLOOD GLUCOSE TEST) strip Test twice daily as needed for DM-2 100 Strip 2   • metoclopramide (REGLAN) 5 MG tablet Take 1 Tab by mouth 4 times a day. 56 Tab 0   • Lancets Misc Lancets for use checking BS Twice daily 100 Each 2   • Blood Glucose Monitoring Suppl (TRUE METRIX AIR GLUCOSE METER) Device 1 Each by Does not apply route 1 time daily as needed. 1 Device 0   • glucose blood (TRUE METRIX BLOOD GLUCOSE TEST) strip 1 Strip by Other route 2 Times a Day. 100 Strip 2   • metformin (GLUCOPHAGE) 1000 MG tablet Take 1 Tab by mouth 2 times a day, with meals. 60 Tab 3     No current facility-administered medications for this visit.        Social History   Substance Use Topics   • Smoking status: Never Smoker   • Smokeless tobacco: Never Used   • Alcohol use Yes      Comment: occ       Family History   Problem Relation Age of Onset   • Diabetes Mother    • Diabetes Maternal Grandmother    • Diabetes Maternal Grandfather    • Heart Disease Neg Hx    • Hypertension Neg Hx    • Hyperlipidemia Neg Hx    • Stroke Neg Hx    • Cancer Neg Hx        ROS:  Review of Systems   See HPI Above        Exam:  Blood pressure 108/68, " "pulse 72, temperature 36.2 °C (97.2 °F), resp. rate 16, height 1.581 m (5' 2.24\"), weight 68.9 kg (152 lb), SpO2 98 %.  General:  Well nourished, well developed female in mild discomfort.  HENT:Head is grossly normal. PERRL.  Neck: Supple. Trachea is midline.  Pulmonary: Clear to ausculation .  Normal effort. No rales, ronchi, or wheezing.   Cardiovascular: Regular rate and rhythm.  Abdomen-Abdomen is soft, No tenderness.  Upper extremities- Good ROM  Lower extremities- neg for edema, redness, tenderness.  Neuro- A & O x 4. Speech clear and appropriate.     Current medications, allergies, and problem list reviewed with patient and updated in  [facility] today.    Assessment/Plan:  1. Type 2 diabetes mellitus without complication, with long-term current use of insulin (CMS-[facility])  simvastatin (ZOCOR) 5 MG Tab  To reduce sugar and carbs in diet    Insulin Glargine (TOUJEO SOLOSTAR) 300 UNIT/ML Solution Pen-injector 40 u at hs  Adjust per discussion Increase Toujeo 2 units every 2 days if BS maintaining >200. Stop titration if having episodes of hypoglycemia.  Continue Metformin 1000 mg BID   2. Candidal vaginitis  fluconazole (DIFLUCAN) 150 MG tablet    miconazole (MICOTIN) 2 % Cream    Follow up in 1 month. Call or return if questions, concerns, or worsening condition.  "

## 2018-02-13 NOTE — ASSESSMENT & PLAN NOTE
Was taking 40 u at Lantus at hs  And Metformin 1000 mg bid  But unable to get lantus from Insurance  Has been out about 2 weeks.

## 2018-03-26 ENCOUNTER — OFFICE VISIT (OUTPATIENT)
Dept: MEDICAL GROUP | Facility: MEDICAL CENTER | Age: 38
End: 2018-03-26
Attending: NURSE PRACTITIONER
Payer: MEDICAID

## 2018-03-26 VITALS
OXYGEN SATURATION: 100 % | RESPIRATION RATE: 16 BRPM | HEART RATE: 88 BPM | TEMPERATURE: 97.4 F | HEIGHT: 62 IN | WEIGHT: 153 LBS | BODY MASS INDEX: 28.16 KG/M2

## 2018-03-26 DIAGNOSIS — Z91.89 AT RISK FOR IMPAIRED DIGESTION: ICD-10-CM

## 2018-03-26 DIAGNOSIS — R25.3 EYE MUSCLE TWITCHES: ICD-10-CM

## 2018-03-26 DIAGNOSIS — R20.2 PARESTHESIA OF RIGHT LEG: ICD-10-CM

## 2018-03-26 DIAGNOSIS — M79.651 PAIN OF RIGHT THIGH: ICD-10-CM

## 2018-03-26 DIAGNOSIS — Z79.4 TYPE 2 DIABETES MELLITUS WITHOUT COMPLICATION, WITH LONG-TERM CURRENT USE OF INSULIN (HCC): ICD-10-CM

## 2018-03-26 DIAGNOSIS — R19.7 DIARRHEA, UNSPECIFIED TYPE: ICD-10-CM

## 2018-03-26 DIAGNOSIS — E11.9 TYPE 2 DIABETES MELLITUS WITHOUT COMPLICATION, WITH LONG-TERM CURRENT USE OF INSULIN (HCC): ICD-10-CM

## 2018-03-26 PROCEDURE — 99212 OFFICE O/P EST SF 10 MIN: CPT | Performed by: NURSE PRACTITIONER

## 2018-03-26 PROCEDURE — 99214 OFFICE O/P EST MOD 30 MIN: CPT | Performed by: NURSE PRACTITIONER

## 2018-03-26 RX ORDER — METOCLOPRAMIDE 5 MG/1
5 TABLET ORAL 4 TIMES DAILY
Qty: 56 TAB | Refills: 0 | Status: SHIPPED | OUTPATIENT
Start: 2018-03-26 | End: 2018-11-29 | Stop reason: SDUPTHER

## 2018-03-26 RX ORDER — LANCETS 30 GAUGE
EACH MISCELLANEOUS
Qty: 100 EACH | Refills: 2 | Status: SHIPPED | OUTPATIENT
Start: 2018-03-26 | End: 2020-09-30

## 2018-03-26 RX ORDER — PEN NEEDLE, DIABETIC 29 G X1/2"
NEEDLE, DISPOSABLE MISCELLANEOUS
Qty: 100 EACH | Refills: 2 | Status: SHIPPED | OUTPATIENT
Start: 2018-03-26 | End: 2020-09-30

## 2018-03-26 NOTE — ASSESSMENT & PLAN NOTE
Taking 40 u at night Toujeo and also Metformin 1000 BID/.  States fasting bs 2 days ago / No lows.  Eating less tortillas and sweets.

## 2018-03-26 NOTE — ASSESSMENT & PLAN NOTE
Pt reports has left eye twitching off and off and some left lateral eyelid swelling  Feels twitching to eye muscles left eye lid.

## 2018-03-26 NOTE — ASSESSMENT & PLAN NOTE
"Pt reports she has \"warm\" feeling on right leg.  Works as [employer] and sits down most of the days. And feels warm anterior thigh and feels warm like warm fluid, typically happens about every hour starting about a week ago.   "

## 2018-03-26 NOTE — PROGRESS NOTES
"Chief Complaint:   Chief Complaint   Patient presents with   • Follow-Up   • Leg Problem     warm sensation on right thigh    • Eye Problem     left eye       HPI:  Bettina is here today for a follow-up on  DM-2, right leg concern.    Her PMH includes:  DM-2, poorly controlled  Liver Disease  PID infection  Chest Pain/Left Shoulder Pain w movement  Risk for impaired digestion  Strep Throat Pharyngitis.  Yeast Infection     Nev  Report:  2/8/18 Norco 5/325 # 36, Jason Florentino MD     Review of Records:  2/12/18 Clinic Visit for Results, Pt reported ran out of Insulin as insurance not filling Lantus. RX for Toujeo and to continue Metformin 1000 BId.  RX Diflucan and Micotin for yeast infection. RX for low dose Statin    2/9/18 A1c= 10.6 ( avg 257) CMP normal except BS(fasting) = 195, TSH= 0.91, Lipids normal except LDL= 118  Microalbumin = 31.     2/6/18 Clinic visit for neck lump and sore throat, exposure to Strep Throat. Rx for motrin, Amoxil Tessalon, Work note.  To return in 1 wk to review labs.    1/22/18 Clinic New Patient appt. Labs ordered, Med Refills, RX for Reglan for trial, to f/u w lab results in 3 wks.     1/15/17 [facility] Appt but left prior to being seen by MD  11/3/17 ER for Chest pain, Left Shoulder Pain w movement.   EKG nsr, no acute changes, Left Shoulder Xray negative, Neg Trop, CBC normal,  CMp normal except BS= 427, Cxr negative.    Nev  Report: no entries    Paresthesia of right leg  Pt reports she has \"warm\" feeling on right leg.  Works as [employer] and sits down most of the days. And feels warm anterior thigh and feels warm like warm fluid, typically happens about every hour starting about a week ago.     Type 2 diabetes mellitus without complication (CMS-HCC)  Taking 40 u at night Toujeo and also Metformin 1000 BID/.  States fasting bs 2 days ago / No lows.  Eating less tortillas and sweets.    Eye muscle twitches  Pt reports has left eye twitching off and off and some left lateral " "eyelid swelling  Feels twitching to eye muscles left eye lid.      Patient Active Problem List    Diagnosis Date Noted   • Paresthesia of right leg 03/26/2018   • Eye muscle twitches 03/26/2018   • Vaginal yeast infection 02/12/2018   • Neck problem 02/06/2018   • Itching 01/22/2018   • At risk for impaired digestion 01/22/2018   • Type 2 diabetes mellitus without complication (CMS-HCC) 07/02/2012   • History of liver disease 01/13/2012       Allergies:Septra [sulfamethoxazole w-trimethoprim]    Medicines as of today:  Current Outpatient Prescriptions   Medication Sig Dispense Refill   • Insulin Glargine (TOUJEO SOLOSTAR) 300 UNIT/ML Solution Pen-injector Inject 40 Units as instructed every bedtime. Increase Toujeo 2 units every 2 days if BS >200. Stop titration if low sugar 10 PEN 2   • Insulin Pen Needle (PEN NEEDLES 29GX1/2\") 29G X 12MM Misc Use with Insulin pen twice daily 100 Each 2   • metoclopramide (REGLAN) 5 MG tablet Take 1 Tab by mouth 4 times a day. 56 Tab 0   • metformin (GLUCOPHAGE) 1000 MG tablet Take 1 Tab by mouth 2 times a day, with meals. 60 Tab 3   • Lancets Misc Lancets for use checking BS Twice daily 100 Each 2   • glucose blood strip 1 Each by Other route 2 Times a Day. One Touch     • simvastatin (ZOCOR) 5 MG Tab Take 1 Tab by mouth every evening. 30 Tab 11   • amoxicillin (AMOXIL) 500 MG Cap Take 1 Cap by mouth 3 times a day. 30 Cap 0     No current facility-administered medications for this visit.        Social History   Substance Use Topics   • Smoking status: Never Smoker   • Smokeless tobacco: Never Used   • Alcohol use Yes      Comment: occ       Past Medical History:   Diagnosis Date   • Diabetes    • DIABETES MELLITUS 1/13/2012   • Infectious disease     PID       Family History   Problem Relation Age of Onset   • Diabetes Mother    • Diabetes Maternal Grandmother    • Diabetes Maternal Grandfather    • Heart Disease Neg Hx    • Hypertension Neg Hx    • Hyperlipidemia Neg Hx    • " "Stroke Neg Hx    • Cancer Neg Hx        ROS:  Review of Systems   See HPI Above    Exam:  Pulse 88, temperature 36.3 °C (97.4 °F), resp. rate 16, height 1.581 m (5' 2.24\"), weight 69.4 kg (153 lb), SpO2 100 %. Body mass index is 27.77 kg/m².    General:  Well nourished, well developed female in NAD  HENT:Head is grossly normal. PERRL. Possible slight asymmetry to upper eyelids w left slightly larger.   Neck: Supple. Trachea is midline.  Pulmonary: Clear to ausculation .  Normal effort. No rales, ronchi, or wheezing.   Cardiovascular: Regular rate and rhythm.  Abdomen-Abdomen is soft, No tenderness.  Upper extremities- Strong = . Good ROM  Lower extremities- neg for edema, redness, tenderness. Poor strength to doing one squat in clinic  Neuro- A & O x 4. Speech clear and appropriate.    Current medications, allergies, and problem list reviewed with patient and [facility].    Assessment/Plan:  1. Type 2 diabetes mellitus without complication, with long-term current use of insulin (CMS-HCC)  Insulin Glargine (TOUJEO SOLOSTAR) 300 UNIT/ML Solution Pen-injector    Insulin Pen Needle (PEN NEEDLES 29GX1/2\") 29G X 12MM Misc    Lancets Misc   2. At risk for impaired digestion  metoclopramide (REGLAN) 5 MG tablet   3. Diarrhea, unspecified type  metoclopramide (REGLAN) 5 MG tablet   4. Uncontrolled type 2 diabetes mellitus without complication, with long-term current use of insulin (CMS-HCC)  metformin (GLUCOPHAGE) 1000 MG tablet    REFERRAL TO OPHTHALMOLOGY   5. Paresthesia of right leg  REFERRAL TO PHYSICAL THERAPY Reason for Therapy: Eval/Treat/Report   6. Eye muscle twitches  REFERRAL TO OPHTHALMOLOGY   7. Pain of right thigh  REFERRAL TO PHYSICAL THERAPY Reason for Therapy: Eval/Treat/Report       Return in about 4 weeks (around 4/23/2018).    "

## 2018-03-26 NOTE — LETTER
March 26, 2018       Patient: Bettina Burdick   YOB: 1980   Date of Visit: 3/26/2018         To Whom It May Concern:     Bettina Burdick was seen in the clinic today for a medical reason.  Please excuse from work today and may return to work without restrictions tomorrow on 3/27/18.    If you have any questions or concerns, please don't hesitate to call [phone]          Sincerely,          JAMARI Knight.P.N.  Electronically Signed

## 2018-07-27 DIAGNOSIS — R68.89 NECK PROBLEM: ICD-10-CM

## 2018-07-30 RX ORDER — IBUPROFEN 800 MG/1
TABLET ORAL
Qty: 30 TAB | Refills: 0 | Status: SHIPPED | OUTPATIENT
Start: 2018-07-30 | End: 2018-10-04 | Stop reason: SDUPTHER

## 2018-08-05 DIAGNOSIS — B37.31 CANDIDAL VAGINITIS: ICD-10-CM

## 2018-08-06 RX ORDER — FLUCONAZOLE 150 MG/1
TABLET ORAL
Qty: 2 TAB | Refills: 1 | Status: SHIPPED | OUTPATIENT
Start: 2018-08-06 | End: 2018-09-12

## 2018-09-12 ENCOUNTER — OFFICE VISIT (OUTPATIENT)
Dept: MEDICAL GROUP | Facility: MEDICAL CENTER | Age: 38
End: 2018-09-12
Attending: NURSE PRACTITIONER
Payer: MEDICAID

## 2018-09-12 VITALS
BODY MASS INDEX: 28.16 KG/M2 | DIASTOLIC BLOOD PRESSURE: 70 MMHG | TEMPERATURE: 97.1 F | WEIGHT: 153 LBS | SYSTOLIC BLOOD PRESSURE: 115 MMHG | RESPIRATION RATE: 14 BRPM | OXYGEN SATURATION: 98 % | HEART RATE: 96 BPM | HEIGHT: 62 IN

## 2018-09-12 DIAGNOSIS — J06.9 UPPER RESPIRATORY TRACT INFECTION, UNSPECIFIED TYPE: ICD-10-CM

## 2018-09-12 PROCEDURE — 99212 OFFICE O/P EST SF 10 MIN: CPT | Performed by: NURSE PRACTITIONER

## 2018-09-12 PROCEDURE — 99214 OFFICE O/P EST MOD 30 MIN: CPT | Performed by: FAMILY MEDICINE

## 2018-09-12 RX ORDER — AZITHROMYCIN 250 MG/1
TABLET, FILM COATED ORAL
Qty: 6 TAB | Refills: 0 | Status: SHIPPED | OUTPATIENT
Start: 2018-09-12 | End: 2020-09-30

## 2018-09-12 RX ORDER — BENZONATATE 100 MG/1
100 CAPSULE ORAL 3 TIMES DAILY PRN
Qty: 60 CAP | Refills: 0 | Status: SHIPPED | OUTPATIENT
Start: 2018-09-12 | End: 2020-09-30

## 2018-09-12 ASSESSMENT — ENCOUNTER SYMPTOMS
HEMOPTYSIS: 0
NAUSEA: 0
RHINORRHEA: 1
SHORTNESS OF BREATH: 0
FEVER: 1
WEIGHT LOSS: 0
VOMITING: 0
ABDOMINAL PAIN: 0
SINUS PAIN: 0
BACK PAIN: 0
CHILLS: 0
HEARTBURN: 0
MYALGIAS: 1
SPUTUM PRODUCTION: 1
PALPITATIONS: 0
SWEATS: 0
NECK PAIN: 0
COUGH: 1
SORE THROAT: 1
WHEEZING: 0
HEADACHES: 1

## 2018-09-12 NOTE — LETTER
September 12, 2018       Patient: Bettina Burdick   YOB: 1980   Date of Visit: 9/12/2018         To Whom It May Concern:    It is my medical opinion that Bettina Burdick remain out of work until tomorrow.    If you have any questions or concerns, please don't hesitate to call [phone]          Sincerely,          Alejandro Babcock M.D.  Electronically Signed

## 2018-09-12 NOTE — PROGRESS NOTES
"Subjective:      Bettina Burdick is a 37 y.o. female who presents with Cough            Cough   This is a new problem. The current episode started in the past 7 days. The problem has been gradually worsening. The cough is productive of sputum (green thick). Associated symptoms include a fever, headaches, myalgias, nasal congestion, postnasal drip, rhinorrhea and a sore throat. Pertinent negatives include no chest pain, chills, ear congestion, ear pain, heartburn, hemoptysis, rash, shortness of breath, sweats, weight loss or wheezing. The symptoms are aggravated by lying down. Treatments tried: NSAIDs, will have her try tessalon perles prn, if worsened sxs will have her start a Zpak. will continue to follow.       Review of Systems   Constitutional: Positive for fever. Negative for chills and weight loss.   HENT: Positive for congestion, postnasal drip, rhinorrhea and sore throat. Negative for ear pain, hearing loss, nosebleeds, sinus pain and tinnitus.    Respiratory: Positive for cough and sputum production. Negative for hemoptysis, shortness of breath and wheezing.    Cardiovascular: Negative for chest pain and palpitations.   Gastrointestinal: Negative for abdominal pain, heartburn, nausea and vomiting.   Musculoskeletal: Positive for myalgias. Negative for back pain, joint pain and neck pain.   Skin: Negative for rash.   Neurological: Positive for headaches.          Objective:     /70   Pulse 96   Temp 36.2 °C (97.1 °F)   Resp 14   Ht 1.575 m (5' 2\")   Wt 69.4 kg (153 lb)   SpO2 98%   BMI 27.98 kg/m²      Physical Exam   Constitutional: She is oriented to person, place, and time. She appears well-developed and well-nourished.   HENT:   Head: Normocephalic and atraumatic.   Cardiovascular: Normal rate, regular rhythm and normal heart sounds.  Exam reveals no friction rub.    No murmur heard.  Pulmonary/Chest: Effort normal. No respiratory distress. She has no wheezes. She has no rales. "   Slight coarse breath sounds    Abdominal: Soft. Bowel sounds are normal. She exhibits no distension. There is no tenderness.   Neurological: She is alert and oriented to person, place, and time.   Skin: Skin is warm and dry.   Psychiatric: She has a normal mood and affect. Her behavior is normal.   Nursing note and vitals reviewed.              Assessment/Plan:     1. Upper respiratory tract infection, unspecified type  Will have patient continue to use her over-the-counter medications as directed. Will also order Tessalon Perles to use as needed. If her symptoms persist for longer than 7-10 days or worsen we'll have her start a Z-Michael as directed. We'll continue to follow.  - benzonatate (TESSALON) 100 MG Cap; Take 1 Cap by mouth 3 times a day as needed for Cough.  Dispense: 60 Cap; Refill: 0  - azithromycin (ZITHROMAX) 250 MG Tab; Use as directed  Dispense: 6 Tab; Refill: 0

## 2018-10-10 ENCOUNTER — NON-PROVIDER VISIT (OUTPATIENT)
Dept: OCCUPATIONAL MEDICINE | Facility: CLINIC | Age: 38
End: 2018-10-10

## 2018-10-10 DIAGNOSIS — Z02.1 PRE-EMPLOYMENT DRUG SCREENING: ICD-10-CM

## 2018-10-10 PROCEDURE — 80305 DRUG TEST PRSMV DIR OPT OBS: CPT | Performed by: INTERNAL MEDICINE

## 2018-11-29 DIAGNOSIS — Z91.89 AT RISK FOR IMPAIRED DIGESTION: ICD-10-CM

## 2018-11-29 DIAGNOSIS — R19.7 DIARRHEA, UNSPECIFIED TYPE: ICD-10-CM

## 2018-12-03 RX ORDER — METOCLOPRAMIDE 5 MG/1
TABLET ORAL
Qty: 56 TAB | Refills: 0 | Status: SHIPPED | OUTPATIENT
Start: 2018-12-03 | End: 2020-09-30

## 2018-12-05 ENCOUNTER — TELEPHONE (OUTPATIENT)
Dept: MEDICAL GROUP | Facility: MEDICAL CENTER | Age: 38
End: 2018-12-05

## 2018-12-05 NOTE — TELEPHONE ENCOUNTER
DOCUMENTATION OF PAR STATUS:    1. Name of Medication & Dose: One Touch Ultra Strips     2. Name of Prescription Coverage Company & phone #: Medicaid HMO    3. Date Prior Auth Submitted: 12/5/2018    4. What information was given to obtain insurance decision? Office notes, Med Hx, Dx    5. Prior Auth Status? Pending    6. Patient Notified: yes

## 2018-12-06 NOTE — TELEPHONE ENCOUNTER
FINAL PRIOR AUTHORIZATION STATUS:    1.  Name of Medication & Dose: One Touch Ultra Strips     2. Prior Auth Status: Denied.  Reason: Pt no longer covered under Medicaid HMO. (see scanned media)    3. Action Taken: Pharmacy Notified: yes Patient Notified: yes

## 2018-12-07 LAB
AMP AMPHETAMINE: NORMAL
COC COCAINE: NORMAL
INT CON NEG: NORMAL
INT CON POS: NORMAL
MET METHAMPHETAMINES: NORMAL
OPI OPIATES: NORMAL
PCP PHENCYCLIDINE: NORMAL
POC DRUG COMMENT 753798-OCCUPATIONAL HEALTH: NORMAL
THC: NORMAL

## 2020-09-30 ENCOUNTER — HOSPITAL ENCOUNTER (OUTPATIENT)
Facility: MEDICAL CENTER | Age: 40
End: 2020-10-01
Attending: EMERGENCY MEDICINE | Admitting: INTERNAL MEDICINE
Payer: COMMERCIAL

## 2020-09-30 ENCOUNTER — APPOINTMENT (OUTPATIENT)
Dept: RADIOLOGY | Facility: MEDICAL CENTER | Age: 40
End: 2020-09-30
Attending: EMERGENCY MEDICINE
Payer: COMMERCIAL

## 2020-09-30 ENCOUNTER — APPOINTMENT (OUTPATIENT)
Dept: RADIOLOGY | Facility: MEDICAL CENTER | Age: 40
End: 2020-09-30
Attending: INTERNAL MEDICINE
Payer: COMMERCIAL

## 2020-09-30 DIAGNOSIS — R29.90 STROKE-LIKE SYMPTOM: ICD-10-CM

## 2020-09-30 PROBLEM — G43.109 COMPLICATED MIGRAINE: Status: ACTIVE | Noted: 2020-09-30

## 2020-09-30 LAB
ALBUMIN SERPL BCP-MCNC: 4.2 G/DL (ref 3.2–4.9)
ALBUMIN/GLOB SERPL: 1.5 G/DL
ALP SERPL-CCNC: 65 U/L (ref 30–99)
ALT SERPL-CCNC: 13 U/L (ref 2–50)
ANION GAP SERPL CALC-SCNC: 15 MMOL/L (ref 7–16)
APPEARANCE UR: CLEAR
AST SERPL-CCNC: 14 U/L (ref 12–45)
BASOPHILS # BLD AUTO: 0.6 % (ref 0–1.8)
BASOPHILS # BLD: 0.04 K/UL (ref 0–0.12)
BILIRUB SERPL-MCNC: 0.5 MG/DL (ref 0.1–1.5)
BILIRUB UR QL STRIP.AUTO: NEGATIVE
BUN SERPL-MCNC: 10 MG/DL (ref 8–22)
CALCIUM SERPL-MCNC: 9.3 MG/DL (ref 8.5–10.5)
CHLORIDE SERPL-SCNC: 99 MMOL/L (ref 96–112)
CO2 SERPL-SCNC: 20 MMOL/L (ref 20–33)
COLOR UR: YELLOW
COVID ORDER STATUS COVID19: NORMAL
CREAT SERPL-MCNC: 0.4 MG/DL (ref 0.5–1.4)
EKG IMPRESSION: NORMAL
EOSINOPHIL # BLD AUTO: 0.09 K/UL (ref 0–0.51)
EOSINOPHIL NFR BLD: 1.4 % (ref 0–6.9)
ERYTHROCYTE [DISTWIDTH] IN BLOOD BY AUTOMATED COUNT: 37.2 FL (ref 35.9–50)
EST. AVERAGE GLUCOSE BLD GHB EST-MCNC: 289 MG/DL
GLOBULIN SER CALC-MCNC: 2.8 G/DL (ref 1.9–3.5)
GLUCOSE BLD-MCNC: 264 MG/DL (ref 65–99)
GLUCOSE SERPL-MCNC: 246 MG/DL (ref 65–99)
GLUCOSE UR STRIP.AUTO-MCNC: >=1000 MG/DL
HBA1C MFR BLD: 11.7 % (ref 0–5.6)
HCG SERPL QL: NEGATIVE
HCT VFR BLD AUTO: 45.5 % (ref 37–47)
HGB BLD-MCNC: 16.3 G/DL (ref 12–16)
IMM GRANULOCYTES # BLD AUTO: 0.02 K/UL (ref 0–0.11)
IMM GRANULOCYTES NFR BLD AUTO: 0.3 % (ref 0–0.9)
KETONES UR STRIP.AUTO-MCNC: NEGATIVE MG/DL
LEUKOCYTE ESTERASE UR QL STRIP.AUTO: NEGATIVE
LYMPHOCYTES # BLD AUTO: 1.68 K/UL (ref 1–4.8)
LYMPHOCYTES NFR BLD: 26.3 % (ref 22–41)
MAGNESIUM SERPL-MCNC: 1.7 MG/DL (ref 1.5–2.5)
MCH RBC QN AUTO: 30.4 PG (ref 27–33)
MCHC RBC AUTO-ENTMCNC: 35.8 G/DL (ref 33.6–35)
MCV RBC AUTO: 84.7 FL (ref 81.4–97.8)
MICRO URNS: ABNORMAL
MONOCYTES # BLD AUTO: 0.31 K/UL (ref 0–0.85)
MONOCYTES NFR BLD AUTO: 4.8 % (ref 0–13.4)
NEUTROPHILS # BLD AUTO: 4.26 K/UL (ref 2–7.15)
NEUTROPHILS NFR BLD: 66.6 % (ref 44–72)
NITRITE UR QL STRIP.AUTO: NEGATIVE
NRBC # BLD AUTO: 0 K/UL
NRBC BLD-RTO: 0 /100 WBC
PH UR STRIP.AUTO: 5 [PH] (ref 5–8)
PLATELET # BLD AUTO: 274 K/UL (ref 164–446)
PMV BLD AUTO: 11.5 FL (ref 9–12.9)
POTASSIUM SERPL-SCNC: 3.6 MMOL/L (ref 3.6–5.5)
PROT SERPL-MCNC: 7 G/DL (ref 6–8.2)
PROT UR QL STRIP: NEGATIVE MG/DL
RBC # BLD AUTO: 5.37 M/UL (ref 4.2–5.4)
RBC UR QL AUTO: NEGATIVE
SARS-COV-2 RNA RESP QL NAA+PROBE: NOTDETECTED
SODIUM SERPL-SCNC: 134 MMOL/L (ref 135–145)
SP GR UR STRIP.AUTO: 1.04
SPECIMEN SOURCE: NORMAL
UROBILINOGEN UR STRIP.AUTO-MCNC: 0.2 MG/DL
WBC # BLD AUTO: 6.4 K/UL (ref 4.8–10.8)

## 2020-09-30 PROCEDURE — U0003 INFECTIOUS AGENT DETECTION BY NUCLEIC ACID (DNA OR RNA); SEVERE ACUTE RESPIRATORY SYNDROME CORONAVIRUS 2 (SARS-COV-2) (CORONAVIRUS DISEASE [COVID-19]), AMPLIFIED PROBE TECHNIQUE, MAKING USE OF HIGH THROUGHPUT TECHNOLOGIES AS DESCRIBED BY CMS-2020-01-R: HCPCS

## 2020-09-30 PROCEDURE — G0378 HOSPITAL OBSERVATION PER HR: HCPCS

## 2020-09-30 PROCEDURE — 83735 ASSAY OF MAGNESIUM: CPT

## 2020-09-30 PROCEDURE — 93005 ELECTROCARDIOGRAM TRACING: CPT

## 2020-09-30 PROCEDURE — 99285 EMERGENCY DEPT VISIT HI MDM: CPT

## 2020-09-30 PROCEDURE — 99220 PR INITIAL OBSERVATION CARE,LEVL III: CPT | Performed by: INTERNAL MEDICINE

## 2020-09-30 PROCEDURE — 85025 COMPLETE CBC W/AUTO DIFF WBC: CPT

## 2020-09-30 PROCEDURE — 81003 URINALYSIS AUTO W/O SCOPE: CPT

## 2020-09-30 PROCEDURE — 70551 MRI BRAIN STEM W/O DYE: CPT

## 2020-09-30 PROCEDURE — 80053 COMPREHEN METABOLIC PANEL: CPT

## 2020-09-30 PROCEDURE — 83036 HEMOGLOBIN GLYCOSYLATED A1C: CPT

## 2020-09-30 PROCEDURE — 700102 HCHG RX REV CODE 250 W/ 637 OVERRIDE(OP): Performed by: INTERNAL MEDICINE

## 2020-09-30 PROCEDURE — A9270 NON-COVERED ITEM OR SERVICE: HCPCS | Performed by: INTERNAL MEDICINE

## 2020-09-30 PROCEDURE — 93005 ELECTROCARDIOGRAM TRACING: CPT | Performed by: EMERGENCY MEDICINE

## 2020-09-30 PROCEDURE — 82962 GLUCOSE BLOOD TEST: CPT

## 2020-09-30 PROCEDURE — C9803 HOPD COVID-19 SPEC COLLECT: HCPCS | Performed by: INTERNAL MEDICINE

## 2020-09-30 PROCEDURE — 70450 CT HEAD/BRAIN W/O DYE: CPT

## 2020-09-30 PROCEDURE — 84703 CHORIONIC GONADOTROPIN ASSAY: CPT

## 2020-09-30 RX ORDER — CYCLOBENZAPRINE HCL 10 MG
5 TABLET ORAL 3 TIMES DAILY PRN
Status: DISCONTINUED | OUTPATIENT
Start: 2020-09-30 | End: 2020-10-01 | Stop reason: HOSPADM

## 2020-09-30 RX ORDER — ONDANSETRON 4 MG/1
4 TABLET, ORALLY DISINTEGRATING ORAL EVERY 4 HOURS PRN
Status: DISCONTINUED | OUTPATIENT
Start: 2020-09-30 | End: 2020-10-01 | Stop reason: HOSPADM

## 2020-09-30 RX ORDER — ONDANSETRON 2 MG/ML
4 INJECTION INTRAMUSCULAR; INTRAVENOUS EVERY 4 HOURS PRN
Status: DISCONTINUED | OUTPATIENT
Start: 2020-09-30 | End: 2020-10-01 | Stop reason: HOSPADM

## 2020-09-30 RX ORDER — PROMETHAZINE HYDROCHLORIDE 25 MG/1
12.5-25 SUPPOSITORY RECTAL EVERY 4 HOURS PRN
Status: DISCONTINUED | OUTPATIENT
Start: 2020-09-30 | End: 2020-10-01 | Stop reason: HOSPADM

## 2020-09-30 RX ORDER — BISACODYL 10 MG
10 SUPPOSITORY, RECTAL RECTAL
Status: DISCONTINUED | OUTPATIENT
Start: 2020-09-30 | End: 2020-10-01 | Stop reason: HOSPADM

## 2020-09-30 RX ORDER — IBUPROFEN 800 MG/1
800 TABLET ORAL
Status: ON HOLD | COMMUNITY
End: 2020-10-01

## 2020-09-30 RX ORDER — ATORVASTATIN CALCIUM 80 MG/1
80 TABLET, FILM COATED ORAL EVERY EVENING
Status: DISCONTINUED | OUTPATIENT
Start: 2020-09-30 | End: 2020-10-01

## 2020-09-30 RX ORDER — PROMETHAZINE HYDROCHLORIDE 25 MG/1
12.5-25 TABLET ORAL EVERY 4 HOURS PRN
Status: DISCONTINUED | OUTPATIENT
Start: 2020-09-30 | End: 2020-10-01 | Stop reason: HOSPADM

## 2020-09-30 RX ORDER — ASPIRIN 81 MG/1
324 TABLET, CHEWABLE ORAL DAILY
Status: DISCONTINUED | OUTPATIENT
Start: 2020-09-30 | End: 2020-10-01

## 2020-09-30 RX ORDER — ASPIRIN 325 MG
325 TABLET ORAL DAILY
Status: DISCONTINUED | OUTPATIENT
Start: 2020-09-30 | End: 2020-10-01

## 2020-09-30 RX ORDER — SIMVASTATIN 5 MG
5 TABLET ORAL EVERY EVENING
Status: DISCONTINUED | OUTPATIENT
Start: 2020-09-30 | End: 2020-09-30

## 2020-09-30 RX ORDER — POLYETHYLENE GLYCOL 3350 17 G/17G
1 POWDER, FOR SOLUTION ORAL
Status: DISCONTINUED | OUTPATIENT
Start: 2020-09-30 | End: 2020-10-01 | Stop reason: HOSPADM

## 2020-09-30 RX ORDER — PROCHLORPERAZINE EDISYLATE 5 MG/ML
5-10 INJECTION INTRAMUSCULAR; INTRAVENOUS EVERY 4 HOURS PRN
Status: DISCONTINUED | OUTPATIENT
Start: 2020-09-30 | End: 2020-10-01 | Stop reason: HOSPADM

## 2020-09-30 RX ORDER — ASPIRIN 300 MG/1
300 SUPPOSITORY RECTAL DAILY
Status: DISCONTINUED | OUTPATIENT
Start: 2020-09-30 | End: 2020-10-01

## 2020-09-30 RX ORDER — AMOXICILLIN 250 MG
2 CAPSULE ORAL 2 TIMES DAILY
Status: DISCONTINUED | OUTPATIENT
Start: 2020-09-30 | End: 2020-10-01 | Stop reason: HOSPADM

## 2020-09-30 RX ORDER — ACETAMINOPHEN 325 MG/1
650 TABLET ORAL EVERY 6 HOURS PRN
Status: DISCONTINUED | OUTPATIENT
Start: 2020-09-30 | End: 2020-10-01 | Stop reason: HOSPADM

## 2020-09-30 RX ORDER — KETOROLAC TROMETHAMINE 30 MG/ML
30 INJECTION, SOLUTION INTRAMUSCULAR; INTRAVENOUS EVERY 6 HOURS PRN
Status: DISCONTINUED | OUTPATIENT
Start: 2020-09-30 | End: 2020-10-01 | Stop reason: HOSPADM

## 2020-09-30 RX ADMIN — ATORVASTATIN CALCIUM 80 MG: 80 TABLET, FILM COATED ORAL at 17:38

## 2020-09-30 RX ADMIN — METFORMIN HYDROCHLORIDE 1000 MG: 500 TABLET ORAL at 18:36

## 2020-09-30 RX ADMIN — ASPIRIN 325 MG: 325 TABLET, FILM COATED ORAL at 17:38

## 2020-09-30 RX ADMIN — DOCUSATE SODIUM 50 MG AND SENNOSIDES 8.6 MG 2 TABLET: 8.6; 5 TABLET, FILM COATED ORAL at 17:38

## 2020-09-30 RX ADMIN — ACETAMINOPHEN 650 MG: 325 TABLET, FILM COATED ORAL at 22:04

## 2020-09-30 ASSESSMENT — ENCOUNTER SYMPTOMS
EYE REDNESS: 0
INSOMNIA: 0
EYE PAIN: 0
NERVOUS/ANXIOUS: 0
CHILLS: 0
EYE DISCHARGE: 0
NECK PAIN: 0
SENSORY CHANGE: 1
STRIDOR: 0
DIARRHEA: 0
ABDOMINAL PAIN: 0
DIZZINESS: 0
DEPRESSION: 0
SHORTNESS OF BREATH: 0
FEVER: 0
BLURRED VISION: 0
FOCAL WEAKNESS: 0
COUGH: 0
HEADACHES: 1
HEARTBURN: 0
VOMITING: 0
NAUSEA: 0
PALPITATIONS: 0
WEIGHT LOSS: 0
BACK PAIN: 0
MYALGIAS: 0
SEIZURES: 0
ORTHOPNEA: 0
SPUTUM PRODUCTION: 0

## 2020-09-30 ASSESSMENT — LIFESTYLE VARIABLES
EVER FELT BAD OR GUILTY ABOUT YOUR DRINKING: NO
DOES PATIENT WANT TO STOP DRINKING: NO
EVER HAD A DRINK FIRST THING IN THE MORNING TO STEADY YOUR NERVES TO GET RID OF A HANGOVER: NO
EVER FELT BAD OR GUILTY ABOUT YOUR DRINKING: NO
ON A TYPICAL DAY WHEN YOU DRINK ALCOHOL HOW MANY DRINKS DO YOU HAVE: 4
CONSUMPTION TOTAL: INCOMPLETE
TOTAL SCORE: 0
EVER HAD A DRINK FIRST THING IN THE MORNING TO STEADY YOUR NERVES TO GET RID OF A HANGOVER: NO
AVERAGE NUMBER OF DAYS PER WEEK YOU HAVE A DRINK CONTAINING ALCOHOL: 1
DO YOU DRINK ALCOHOL: NO
DOES PATIENT WANT TO STOP DRINKING: NO
CONSUMPTION TOTAL: NEGATIVE
HAVE YOU EVER FELT YOU SHOULD CUT DOWN ON YOUR DRINKING: NO
HAVE YOU EVER FELT YOU SHOULD CUT DOWN ON YOUR DRINKING: NO
TOTAL SCORE: 0
TOTAL SCORE: 0
HAVE PEOPLE ANNOYED YOU BY CRITICIZING YOUR DRINKING: NO
HAVE PEOPLE ANNOYED YOU BY CRITICIZING YOUR DRINKING: NO
TOTAL SCORE: 0
TOTAL SCORE: 0
ALCOHOL_USE: YES
HOW MANY TIMES IN THE PAST YEAR HAVE YOU HAD 5 OR MORE DRINKS IN A DAY: 0
TOTAL SCORE: 0

## 2020-09-30 ASSESSMENT — COGNITIVE AND FUNCTIONAL STATUS - GENERAL
DAILY ACTIVITIY SCORE: 24
SUGGESTED CMS G CODE MODIFIER MOBILITY: CH
MOBILITY SCORE: 24
SUGGESTED CMS G CODE MODIFIER DAILY ACTIVITY: CH

## 2020-09-30 ASSESSMENT — PATIENT HEALTH QUESTIONNAIRE - PHQ9
2. FEELING DOWN, DEPRESSED, IRRITABLE, OR HOPELESS: NOT AT ALL
SUM OF ALL RESPONSES TO PHQ9 QUESTIONS 1 AND 2: 0
1. LITTLE INTEREST OR PLEASURE IN DOING THINGS: NOT AT ALL

## 2020-09-30 ASSESSMENT — FIBROSIS 4 INDEX: FIB4 SCORE: 0.55

## 2020-09-30 ASSESSMENT — PAIN DESCRIPTION - PAIN TYPE
TYPE: ACUTE PAIN
TYPE: ACUTE PAIN

## 2020-09-30 NOTE — ASSESSMENT & PLAN NOTE
Pain control  Iv toradol, flexeril, tylenol as needed  If not improve, will consider giving her different meds  We will get MRI of the brain

## 2020-09-30 NOTE — PROGRESS NOTES
Triage officer note    ERP requested admission Dr. Caroline Burdick 39 y.o. female presented to the hospital with complaint of dizziness and left-sided numbness of the body.  She has paresthesia on the left side.  As per ER patient does not have motor focal neurological deficit.  CT Head did not show any acute abnormalities.

## 2020-09-30 NOTE — ED TRIAGE NOTES
"Chief Complaint   Patient presents with   • Dizziness     x3 days.    • Numbness     L side. Intermittent x5 days.     /81   Pulse 88   Temp 36.4 °C (97.5 °F) (Temporal)   Resp 16   Ht 1.575 m (5' 2\")   Wt 68.1 kg (150 lb 2.1 oz)   SpO2 99%   Breastfeeding No   BMI 27.46 kg/m²     Pt ambulated into triage, mask in place. EKG completed. Pt states ~1 month ago she hit the back L side of her head while wrestling with her kids and now reporting \"throbbing/pressure\" in that region. , reports normal range. NAD, encouraged to return to the triage nurse or tech with any new complaints or symptoms.  "

## 2020-09-30 NOTE — H&P
Hospital Medicine History & Physical Note    Date of Service  9/30/2020    Primary Care Physician  No primary care provider on file.    Consultants  none    Code Status  Full Code    Chief Complaint  Chief Complaint   Patient presents with   • Dizziness     x3 days.    • Numbness     L side. Intermittent x5 days.       History of Presenting Illness  39 y.o. female with a past history of migraine headache who presented 9/30/2020 with severe headache for the past 1 week associated with left-sided tingling numbness sensation.  She described her headache as a squeezing sensation over both frontal area, constant in nature, 8 out of 10 intensity.  Worsen with bright light and noise.  Currently she does not complain about any neurological deficit.  In the ER she has CT scan of the head done and it was negative.  She will be admitted for observation.    Review of Systems  Review of Systems   Constitutional: Negative for chills, fever and weight loss.   HENT: Negative for congestion and nosebleeds.    Eyes: Negative for blurred vision, pain, discharge and redness.   Respiratory: Negative for cough, sputum production, shortness of breath and stridor.    Cardiovascular: Negative for chest pain, palpitations and orthopnea.   Gastrointestinal: Negative for abdominal pain, diarrhea, heartburn, nausea and vomiting.   Genitourinary: Negative for dysuria, frequency and urgency.   Musculoskeletal: Negative for back pain, myalgias and neck pain.   Skin: Negative for itching and rash.   Neurological: Positive for sensory change and headaches. Negative for dizziness, focal weakness and seizures.   Psychiatric/Behavioral: Negative for depression. The patient is not nervous/anxious and does not have insomnia.        Past Medical History   has a past medical history of Diabetes, DIABETES MELLITUS (1/13/2012), and Infectious disease.    Surgical History   has a past surgical history that includes other; other abdominal surgery; and  cholecystectomy.     Family History  family history includes Diabetes in her maternal grandfather, maternal grandmother, and mother.     Social History   reports that she has never smoked. She has never used smokeless tobacco. She reports current alcohol use. She reports that she does not use drugs.    Allergies  Allergies   Allergen Reactions   • Septra [Sulfamethoxazole W-Trimethoprim] Rash     Body rash       Medications  Prior to Admission Medications   Prescriptions Last Dose Informant Patient Reported? Taking?   ibuprofen (MOTRIN) 800 MG Tab 9/28/2020 at pm Patient Yes Yes   Sig: Take 800 mg by mouth at bedtime as needed.   metformin (GLUCOPHAGE) 1000 MG tablet 9/30/2020 at 0900 Patient No No   Sig: Take 1 Tab by mouth 2 times a day, with meals.   simvastatin (ZOCOR) 5 MG Tab 9/29/2020 at 2300 Patient No No   Sig: Take 1 Tab by mouth every evening.      Facility-Administered Medications: None       Physical Exam  Temp:  [36.4 °C (97.5 °F)] 36.4 °C (97.5 °F)  Pulse:  [80-88] 80  Resp:  [16] 16  BP: ()/(52-81) 108/69  SpO2:  [97 %-99 %] 98 %    Physical Exam  Vitals signs reviewed.   Constitutional:       General: She is not in acute distress.     Appearance: Normal appearance.   HENT:      Head: Normocephalic and atraumatic.      Nose: No congestion or rhinorrhea.   Eyes:      Extraocular Movements: Extraocular movements intact.      Pupils: Pupils are equal, round, and reactive to light.   Neck:      Musculoskeletal: Normal range of motion and neck supple.   Cardiovascular:      Rate and Rhythm: Normal rate and regular rhythm.      Pulses: Normal pulses.   Pulmonary:      Effort: Pulmonary effort is normal. No respiratory distress.      Breath sounds: Normal breath sounds.   Abdominal:      General: Bowel sounds are normal. There is no distension.      Palpations: Abdomen is soft.      Tenderness: There is no abdominal tenderness.   Musculoskeletal:         General: No swelling or tenderness.   Skin:      General: Skin is warm.      Findings: No erythema.   Neurological:      General: No focal deficit present.      Mental Status: She is alert and oriented to person, place, and time.         Laboratory:  Recent Labs     09/30/20  1224   WBC 6.4   RBC 5.37   HEMOGLOBIN 16.3*   HEMATOCRIT 45.5   MCV 84.7   MCH 30.4   MCHC 35.8*   RDW 37.2   PLATELETCT 274   MPV 11.5     Recent Labs     09/30/20  1224   SODIUM 134*   POTASSIUM 3.6   CHLORIDE 99   CO2 20   GLUCOSE 246*   BUN 10   CREATININE 0.40*   CALCIUM 9.3     Recent Labs     09/30/20  1224   ALTSGPT 13   ASTSGOT 14   ALKPHOSPHAT 65   TBILIRUBIN 0.5   GLUCOSE 246*         No results for input(s): NTPROBNP in the last 72 hours.      No results for input(s): TROPONINT in the last 72 hours.    Imaging:  CT-HEAD W/O   Final Result      Head CT without contrast within normal limits. No evidence of acute cerebral infarction, hemorrhage or mass lesion.      MR-BRAIN-W/O    (Results Pending)         Assessment/Plan:  I anticipate this patient is appropriate for observation status at this time.    Complicated migraine  Assessment & Plan  Pain control  Iv toradol, flexeril, tylenol as needed  If not improve, will consider giving her different meds  We will get MRI of the brain      Type 2 diabetes mellitus without complication (HCC)- (present on admission)  Assessment & Plan  Continue outpatient medications

## 2020-09-30 NOTE — ED NOTES
Med rec complete per pt.  Per pt she is waiting for insurance to approve New Prescription for Novolog.   Per Pt she was on Novolin R a month ago and Toujeo a year ago.  Pt currently on no insulin while waiting for Novolog insurance approval.  Allergies reviewed

## 2020-09-30 NOTE — ED PROVIDER NOTES
"ED Provider Note    CHIEF COMPLAINT  Chief Complaint   Patient presents with   • Dizziness     x3 days.    • Numbness     L side. Intermittent x5 days.       HPI  Bettina Burdick is a 39 y.o. female here for evaluation of dizziness and left-sided numbness to the body.  Patient states that she has been having intermittent numbness and tingling over the last few weeks, and states that it is only on the left side of her body.  She states she has left face, arm and leg paresthesias, that resolved after a few minutes.  She does not have any issues on the right side.  Patient has a mild headache as well.  She has no fever chills, and no vomiting.  Patient denies any fall or trauma.  She denies any history of the same.  She denies any migraine headaches issues.  She has no dysuria, urgency or frequency.  Nothing seems alleviate exacerbate her symptoms.  She has not had any increase in stress, and states that she had to leave work early the other day because of the paresthesias.      ROS  See HPI for further details, o/w negative.     PAST MEDICAL HISTORY   has a past medical history of Diabetes, DIABETES MELLITUS (1/13/2012), and Infectious disease.    SOCIAL HISTORY  Social History     Tobacco Use   • Smoking status: Never Smoker   • Smokeless tobacco: Never Used   Substance and Sexual Activity   • Alcohol use: Yes     Comment: occ   • Drug use: No   • Sexual activity: Yes     Birth control/protection: I.U.D.       Family History  No bleeding disorders    SURGICAL HISTORY   has a past surgical history that includes other; other abdominal surgery; and cholecystectomy.    CURRENT MEDICATIONS  Home Medications     Reviewed by Stephanie Yost R.N. (Registered Nurse) on 09/30/20 at 1126  Med List Status: Not Addressed   Medication Last Dose Status   ibuprofen (MOTRIN) 800 MG Tab  Active   Insulin Glargine (TOUJEO SOLOSTAR) 300 UNIT/ML Solution Pen-injector  Active   Insulin Pen Needle (PEN NEEDLES 29GX1/2\") 29G X " 12MM Misc  Active   Lancets Misc  Active   metformin (GLUCOPHAGE) 1000 MG tablet  Active   metoclopramide (REGLAN) 5 MG tablet  Active   ONE TOUCH ULTRA TEST strip  Active   simvastatin (ZOCOR) 5 MG Tab  Active                ALLERGIES  Allergies   Allergen Reactions   • Septra [Sulfamethoxazole W-Trimethoprim] Rash     Body rash       REVIEW OF SYSTEMS  See HPI for further details. Review of systems as above, otherwise all other systems are negative.     PHYSICAL EXAM  Constitutional: Well developed, well nourished. No acute distress.  HEENT: Normocephalic, atraumatic. Posterior pharynx clear and moist.  Eyes:  EOMI. Normal sclera.  Neck: Supple, Full range of motion, nontender.  Chest/Pulmonary: clear to ausculation. Symmetrical expansion.   Cardio: Regular rate and rhythm with no murmur.   Abdomen: Soft, nontender. No peritoneal signs. No guarding. No palpable masses.  Musculoskeletal: No deformity, no edema, neurovascular intact.   Neuro: Clear speech, appropriate, cooperative, cranial nerves II-XII grossly intact.  Negative Romberg, dorsi/plantar flex extension 5-5 bilateral lower extremities, good finger-nose bilaterally.  Psych: Normal mood and affect      Results for orders placed or performed during the hospital encounter of 09/30/20   CBC w/ Differential   Result Value Ref Range    WBC 6.4 4.8 - 10.8 K/uL    RBC 5.37 4.20 - 5.40 M/uL    Hemoglobin 16.3 (H) 12.0 - 16.0 g/dL    Hematocrit 45.5 37.0 - 47.0 %    MCV 84.7 81.4 - 97.8 fL    MCH 30.4 27.0 - 33.0 pg    MCHC 35.8 (H) 33.6 - 35.0 g/dL    RDW 37.2 35.9 - 50.0 fL    Platelet Count 274 164 - 446 K/uL    MPV 11.5 9.0 - 12.9 fL    Neutrophils-Polys 66.60 44.00 - 72.00 %    Lymphocytes 26.30 22.00 - 41.00 %    Monocytes 4.80 0.00 - 13.40 %    Eosinophils 1.40 0.00 - 6.90 %    Basophils 0.60 0.00 - 1.80 %    Immature Granulocytes 0.30 0.00 - 0.90 %    Nucleated RBC 0.00 /100 WBC    Neutrophils (Absolute) 4.26 2.00 - 7.15 K/uL    Lymphs (Absolute) 1.68 1.00  - 4.80 K/uL    Monos (Absolute) 0.31 0.00 - 0.85 K/uL    Eos (Absolute) 0.09 0.00 - 0.51 K/uL    Baso (Absolute) 0.04 0.00 - 0.12 K/uL    Immature Granulocytes (abs) 0.02 0.00 - 0.11 K/uL    NRBC (Absolute) 0.00 K/uL   Complete Metabolic Panel (CMP)   Result Value Ref Range    Sodium 134 (L) 135 - 145 mmol/L    Potassium 3.6 3.6 - 5.5 mmol/L    Chloride 99 96 - 112 mmol/L    Co2 20 20 - 33 mmol/L    Anion Gap 15.0 7.0 - 16.0    Glucose 246 (H) 65 - 99 mg/dL    Bun 10 8 - 22 mg/dL    Creatinine 0.40 (L) 0.50 - 1.40 mg/dL    Calcium 9.3 8.5 - 10.5 mg/dL    AST(SGOT) 14 12 - 45 U/L    ALT(SGPT) 13 2 - 50 U/L    Alkaline Phosphatase 65 30 - 99 U/L    Total Bilirubin 0.5 0.1 - 1.5 mg/dL    Albumin 4.2 3.2 - 4.9 g/dL    Total Protein 7.0 6.0 - 8.2 g/dL    Globulin 2.8 1.9 - 3.5 g/dL    A-G Ratio 1.5 g/dL   Magnesium   Result Value Ref Range    Magnesium 1.7 1.5 - 2.5 mg/dL   BETA-HCG QUALITATIVE SERUM   Result Value Ref Range    Beta-Hcg Qualitative Serum Negative Negative   ESTIMATED GFR   Result Value Ref Range    GFR If African American >60 >60 mL/min/1.73 m 2    GFR If Non African American >60 >60 mL/min/1.73 m 2   ACCU-CHEK GLUCOSE   Result Value Ref Range    Glucose - Accu-Ck 264 (H) 65 - 99 mg/dL   EKG (NOW)   Result Value Ref Range    Report       [facility] Emergency Dept.    Test Date:  2020  Pt Name:    OFELIA MCKINNEY GIANNA   Department: ER  MRN:        [id number]                      Room:  Gender:     Female                       Technician: [id number]  :        1980                   Requested By:ER TRIAGE PROTOCOL  Order #:    [id number]                    Reading MD:    Measurements  Intervals                                Axis  Rate:       79                           P:          38  NV:         164                          QRS:        71  QRSD:       80                           T:          32  QT:         392  QTc:        450    Interpretive Statements  SINUS  RHYTHM  Compared to ECG 11/03/2017 22:02:40  No significant changes       CT-HEAD W/O   Final Result      Head CT without contrast within normal limits. No evidence of acute cerebral infarction, hemorrhage or mass lesion.        Ekg;  nsr 79. No st elevation, no st depression, no ectopy.  qtc 450.     PROCEDURES     MEDICAL RECORD  I have reviewed patient's medical record and pertinent results are listed.    COURSE & MEDICAL DECISION MAKING  I have reviewed any medical record information, laboratory studies and radiographic results as noted above.    2:29 PM  The pt will need to be admitted for her intermittent left side arm/leg/face paresthesias. She will likely need an MRI as well.     FINAL IMPRESSION  1. Stroke-like symptom             Electronically signed by: Jacob Velazquez D.O., 9/30/2020 12:48 PM

## 2020-10-01 ENCOUNTER — PATIENT OUTREACH (OUTPATIENT)
Dept: HEALTH INFORMATION MANAGEMENT | Facility: OTHER | Age: 40
End: 2020-10-01

## 2020-10-01 VITALS
DIASTOLIC BLOOD PRESSURE: 71 MMHG | OXYGEN SATURATION: 95 % | HEART RATE: 80 BPM | RESPIRATION RATE: 16 BRPM | SYSTOLIC BLOOD PRESSURE: 104 MMHG | WEIGHT: 147.71 LBS | TEMPERATURE: 99.4 F | BODY MASS INDEX: 27.18 KG/M2 | HEIGHT: 62 IN

## 2020-10-01 PROBLEM — R51.9 HEADACHE: Status: ACTIVE | Noted: 2020-09-30

## 2020-10-01 PROBLEM — E83.42 HYPOMAGNESEMIA: Status: ACTIVE | Noted: 2020-10-01

## 2020-10-01 LAB
ANION GAP SERPL CALC-SCNC: 14 MMOL/L (ref 7–16)
BUN SERPL-MCNC: 13 MG/DL (ref 8–22)
CALCIUM SERPL-MCNC: 9.2 MG/DL (ref 8.5–10.5)
CHLORIDE SERPL-SCNC: 99 MMOL/L (ref 96–112)
CHOLEST SERPL-MCNC: 179 MG/DL (ref 100–199)
CO2 SERPL-SCNC: 24 MMOL/L (ref 20–33)
CREAT SERPL-MCNC: 0.51 MG/DL (ref 0.5–1.4)
ERYTHROCYTE [DISTWIDTH] IN BLOOD BY AUTOMATED COUNT: 38.9 FL (ref 35.9–50)
GLUCOSE BLD-MCNC: 289 MG/DL (ref 65–99)
GLUCOSE SERPL-MCNC: 244 MG/DL (ref 65–99)
HCT VFR BLD AUTO: 46.1 % (ref 37–47)
HDLC SERPL-MCNC: 52 MG/DL
HGB BLD-MCNC: 16.3 G/DL (ref 12–16)
LDLC SERPL CALC-MCNC: 96 MG/DL
MCH RBC QN AUTO: 30.4 PG (ref 27–33)
MCHC RBC AUTO-ENTMCNC: 35.4 G/DL (ref 33.6–35)
MCV RBC AUTO: 86 FL (ref 81.4–97.8)
PLATELET # BLD AUTO: 302 K/UL (ref 164–446)
PMV BLD AUTO: 12 FL (ref 9–12.9)
POTASSIUM SERPL-SCNC: 3.8 MMOL/L (ref 3.6–5.5)
RBC # BLD AUTO: 5.36 M/UL (ref 4.2–5.4)
SODIUM SERPL-SCNC: 137 MMOL/L (ref 135–145)
TRIGL SERPL-MCNC: 154 MG/DL (ref 0–149)
WBC # BLD AUTO: 7 K/UL (ref 4.8–10.8)

## 2020-10-01 PROCEDURE — 700102 HCHG RX REV CODE 250 W/ 637 OVERRIDE(OP): Performed by: INTERNAL MEDICINE

## 2020-10-01 PROCEDURE — 85027 COMPLETE CBC AUTOMATED: CPT

## 2020-10-01 PROCEDURE — 80048 BASIC METABOLIC PNL TOTAL CA: CPT

## 2020-10-01 PROCEDURE — 96375 TX/PRO/DX INJ NEW DRUG ADDON: CPT

## 2020-10-01 PROCEDURE — 96366 THER/PROPH/DIAG IV INF ADDON: CPT

## 2020-10-01 PROCEDURE — A9270 NON-COVERED ITEM OR SERVICE: HCPCS | Performed by: INTERNAL MEDICINE

## 2020-10-01 PROCEDURE — 700102 HCHG RX REV CODE 250 W/ 637 OVERRIDE(OP): Performed by: FAMILY MEDICINE

## 2020-10-01 PROCEDURE — 97535 SELF CARE MNGMENT TRAINING: CPT

## 2020-10-01 PROCEDURE — 36415 COLL VENOUS BLD VENIPUNCTURE: CPT

## 2020-10-01 PROCEDURE — 96372 THER/PROPH/DIAG INJ SC/IM: CPT

## 2020-10-01 PROCEDURE — 700111 HCHG RX REV CODE 636 W/ 250 OVERRIDE (IP): Performed by: INTERNAL MEDICINE

## 2020-10-01 PROCEDURE — G0378 HOSPITAL OBSERVATION PER HR: HCPCS

## 2020-10-01 PROCEDURE — 700111 HCHG RX REV CODE 636 W/ 250 OVERRIDE (IP): Performed by: FAMILY MEDICINE

## 2020-10-01 PROCEDURE — 80061 LIPID PANEL: CPT

## 2020-10-01 PROCEDURE — 82962 GLUCOSE BLOOD TEST: CPT

## 2020-10-01 PROCEDURE — 96376 TX/PRO/DX INJ SAME DRUG ADON: CPT

## 2020-10-01 PROCEDURE — 96365 THER/PROPH/DIAG IV INF INIT: CPT

## 2020-10-01 PROCEDURE — 99217 PR OBSERVATION CARE DISCHARGE: CPT | Performed by: FAMILY MEDICINE

## 2020-10-01 RX ORDER — PROCHLORPERAZINE EDISYLATE 5 MG/ML
10 INJECTION INTRAMUSCULAR; INTRAVENOUS ONCE
Status: COMPLETED | OUTPATIENT
Start: 2020-10-01 | End: 2020-10-01

## 2020-10-01 RX ORDER — DIPHENHYDRAMINE HYDROCHLORIDE 50 MG/ML
25 INJECTION INTRAMUSCULAR; INTRAVENOUS ONCE
Status: COMPLETED | OUTPATIENT
Start: 2020-10-01 | End: 2020-10-01

## 2020-10-01 RX ORDER — MAGNESIUM SULFATE HEPTAHYDRATE 40 MG/ML
2 INJECTION, SOLUTION INTRAVENOUS ONCE
Status: COMPLETED | OUTPATIENT
Start: 2020-10-01 | End: 2020-10-01

## 2020-10-01 RX ADMIN — INSULIN HUMAN 6 UNITS: 100 INJECTION, SOLUTION PARENTERAL at 11:11

## 2020-10-01 RX ADMIN — ACETAMINOPHEN 650 MG: 325 TABLET, FILM COATED ORAL at 04:27

## 2020-10-01 RX ADMIN — MAGNESIUM SULFATE IN WATER 2 G: 40 INJECTION, SOLUTION INTRAVENOUS at 10:23

## 2020-10-01 RX ADMIN — PROCHLORPERAZINE EDISYLATE 10 MG: 5 INJECTION INTRAMUSCULAR; INTRAVENOUS at 10:15

## 2020-10-01 RX ADMIN — PROCHLORPERAZINE EDISYLATE 10 MG: 5 INJECTION INTRAMUSCULAR; INTRAVENOUS at 06:13

## 2020-10-01 RX ADMIN — ASPIRIN 325 MG: 325 TABLET, FILM COATED ORAL at 04:27

## 2020-10-01 RX ADMIN — DIPHENHYDRAMINE HYDROCHLORIDE 25 MG: 50 INJECTION INTRAMUSCULAR; INTRAVENOUS at 10:13

## 2020-10-01 ASSESSMENT — PAIN DESCRIPTION - PAIN TYPE: TYPE: ACUTE PAIN

## 2020-10-01 NOTE — CARE PLAN
Problem: Communication  Goal: The ability to communicate needs accurately and effectively will improve  Outcome: PROGRESSING AS EXPECTED  Intervention: [place] patient and significant other/support system to call light to alert staff of needs  Note: Pt educated on POC and medications. Pt verbalized understanding.      Problem: Safety  Goal: Will remain free from injury  Outcome: PROGRESSING AS EXPECTED  Intervention: Provide assistance with mobility  Note: Pt bedside table and call-light are within reach, bed in lowest position and locked. Treaded socks on and pt has been educated on call light use and asked to call before getting up.

## 2020-10-01 NOTE — PROGRESS NOTES
Orders received to dc patient home. Pt educated on s&s to report to provider, as well as proper phone numbers to call. Pt states they have no further questions and feels comfortable leaving. Pt transported to front of hospital with RN.

## 2020-10-01 NOTE — CARE PLAN
Problem: Communication  Goal: The ability to communicate needs accurately and effectively will improve  10/1/2020 0752 by Lindsey Lala R.N.  Outcome: PROGRESSING AS EXPECTED  9/30/2020 1811 by Lindsey Lala R.N.  Outcome: PROGRESSING AS EXPECTED     Problem: Safety  Goal: Will remain free from injury  10/1/2020 0752 by Lindsey Lala R.N.  Outcome: PROGRESSING AS EXPECTED  9/30/2020 1811 by Lindsey Lala R.N.  Outcome: PROGRESSING AS EXPECTED  Goal: Will remain free from falls  10/1/2020 0752 by Lindsey Lala R.N.  Outcome: PROGRESSING AS EXPECTED  9/30/2020 1811 by Lindsey Lala R.N.  Outcome: PROGRESSING AS EXPECTED     Problem: Infection  Goal: Will remain free from infection  10/1/2020 0752 by Lindsey Lala R.N.  Outcome: PROGRESSING AS EXPECTED  9/30/2020 1811 by Lindsey Lala R.N.  Outcome: PROGRESSING AS EXPECTED     Problem: Venous Thromboembolism (VTW)/Deep Vein Thrombosis (DVT) Prevention:  Goal: Patient will participate in Venous Thrombosis (VTE)/Deep Vein Thrombosis (DVT)Prevention Measures  10/1/2020 0752 by Lindsey Lala R.N.  Outcome: PROGRESSING AS EXPECTED  9/30/2020 1811 by Lindsey Lala R.N.  Outcome: PROGRESSING AS EXPECTED     Problem: Bowel/Gastric:  Goal: Normal bowel function is maintained or improved  10/1/2020 0752 by Lindsey Lala R.N.  Outcome: PROGRESSING AS EXPECTED  9/30/2020 1811 by Lindsey aLla R.N.  Outcome: PROGRESSING AS EXPECTED  Goal: Will not experience complications related to bowel motility  10/1/2020 0752 by Lindsey Lala R.N.  Outcome: PROGRESSING AS EXPECTED  9/30/2020 1811 by Lindsey Lala R.N.  Outcome: PROGRESSING AS EXPECTED     Problem: Knowledge Deficit  Goal: Knowledge of disease process/condition, treatment plan, diagnostic tests, and medications will improve  10/1/2020 0752 by Lindsey Lala R.N.  Outcome: PROGRESSING AS EXPECTED  9/30/2020 1811 by Lindsey Lala R.N.  Outcome: PROGRESSING AS EXPECTED  Goal: Knowledge of the prescribed therapeutic  regimen will improve  10/1/2020 0752 by Lindsey Lala R.N.  Outcome: PROGRESSING AS EXPECTED  9/30/2020 1811 by Lindsey Lala R.N.  Outcome: PROGRESSING AS EXPECTED     Problem: Discharge Barriers/Planning  Goal: Patient's continuum of care needs will be met  10/1/2020 0752 by Lindsey Lala R.N.  Outcome: PROGRESSING AS EXPECTED  9/30/2020 1811 by Lindsey Lala R.N.  Outcome: PROGRESSING AS EXPECTED     Problem: Pain Management  Goal: Pain level will decrease to patient's comfort goal  10/1/2020 0752 by Lindsey Lala R.N.  Outcome: PROGRESSING AS EXPECTED  9/30/2020 1811 by Lindsey Lala R.N.  Outcome: PROGRESSING AS EXPECTED

## 2020-10-01 NOTE — DISCHARGE INSTRUCTIONS
Discharge Instructions    Discharged to home by car with friend. Discharged via wheelchair, hospital escort: Yes.  Special equipment needed: Not Applicable    Be sure to schedule a follow-up appointment with your primary care doctor or any specialists as instructed.     Discharge Plan:   Influenza Vaccine Indication: Patient Refuses    I understand that a diet low in cholesterol, fat, and sodium is recommended for good health. Unless I have been given specific instructions below for another diet, I accept this instruction as my diet prescription.   Other diet: diabetic    Special Instructions: None    · Is patient discharged on Warfarin / Coumadin?   No     Depression / Suicide Risk    As you are discharged from this Critical access hospital facility, it is important to learn how to keep safe from harming yourself.    Recognize the warning signs:  · Abrupt changes in personality, positive or negative- including increase in energy   · Giving away possessions  · Change in eating patterns- significant weight changes-  positive or negative  · Change in sleeping patterns- unable to sleep or sleeping all the time   · Unwillingness or inability to communicate  · Depression  · Unusual sadness, discouragement and loneliness  · Talk of wanting to die  · Neglect of personal appearance   · Rebelliousness- reckless behavior  · Withdrawal from people/activities they love  · Confusion- inability to concentrate     If you or a loved one observes any of these behaviors or has concerns about self-harm, here's what you can do:  · Talk about it- your feelings and reasons for harming yourself  · Remove any means that you might use to hurt yourself (examples: pills, rope, extension cords, firearm)  · Get professional help from the community (Mental Health, Substance Abuse, psychological counseling)  · Do not be alone:Call your Safe Contact- someone whom you trust who will be there for you.  · Call your local CRISIS HOTLINE 488-5314 or  419.551.8960  · Call your local Children's Mobile Crisis Response Team [place] (443) 480-9602 or www.Virtual Call Center  · Call the toll free National Suicide Prevention Hotlines   · National Suicide Prevention Lifeline 603-108-BVZS (6483)  · National Hope Line Network 800-SUICIDE (009-2810)    Sitagliptin oral tablet  What is this medicine?  SITAGLIPTIN (sit a GLIP tin) helps to treat type 2 diabetes. It helps to control blood sugar. Treatment is combined with diet and exercise.  This medicine may be used for other purposes; ask your health care provider or pharmacist if you have questions.  COMMON BRAND NAME(S): Januvia  What should I tell my health care provider before I take this medicine?  They need to know if you have any of these conditions:  · diabetic ketoacidosis  · kidney disease  · pancreatitis  · previous swelling of the tongue, face, or lips with difficulty breathing, difficulty swallowing, hoarseness, or tightening of the throat  · type 1 diabetes  · an unusual or allergic reaction to sitagliptin, other medicines, foods, dyes, or preservatives  · pregnant or trying to get pregnant  · breast-feeding  How should I use this medicine?  Take this medicine by mouth with a glass of water. Follow the directions on the prescription label. You can take it with or without food. Do not cut, crush or chew this medicine. Take your dose at the same time each day. Do not take more often than directed. Do not stop taking except on your doctor's advice.  A special MedGuide will be given to you by the pharmacist with each prescription and refill. Be sure to read this information carefully each time.  Talk to your pediatrician regarding the use of this medicine in children. Special care may be needed.  Overdosage: If you think you have taken too much of this medicine contact a poison control center or emergency room at once.  NOTE: This medicine is only for you. Do not share this medicine with others.  What if I miss  a dose?  If you miss a dose, take it as soon as you can. If it is almost time for your next dose, take only that dose. Do not take double or extra doses.  What may interact with this medicine?  Do not take this medicine with any of the following medications:  · gatifloxacin  This medicine may also interact with the following medications:  · alcohol  · digoxin  · insulin  · sulfonylureas like glimepiride, glipizide, glyburide  This list may not describe all possible interactions. Give your health care provider a list of all the medicines, herbs, non-prescription drugs, or dietary supplements you use. Also tell them if you smoke, drink alcohol, or use illegal drugs. Some items may interact with your medicine.  What should I watch for while using this medicine?  Visit your doctor or health care professional for regular checks on your progress.  A test called the HbA1C (A1C) will be monitored. This is a simple blood test. It measures your blood sugar control over the last 2 to 3 months. You will receive this test every 3 to 6 months.  Learn how to check your blood sugar. Learn the symptoms of low and high blood sugar and how to manage them.  Always carry a quick-source of sugar with you in case you have symptoms of low blood sugar. Examples include hard sugar candy or glucose tablets. Make sure others know that you can choke if you eat or drink when you develop serious symptoms of low blood sugar, such as seizures or unconsciousness. They must get medical help at once.  Tell your doctor or health care professional if you have high blood sugar. You might need to change the dose of your medicine. If you are sick or exercising more than usual, you might need to change the dose of your medicine.  Do not skip meals. Ask your doctor or health care professional if you should avoid alcohol. Many nonprescription cough and cold products contain sugar or alcohol. These can affect blood sugar.  Wear a medical ID bracelet or chain,  and carry a card that describes your disease and details of your medicine and dosage times.  What side effects may I notice from receiving this medicine?  Side effects that you should report to your doctor or health care professional as soon as possible:  · allergic reactions like skin rash, itching or hives, swelling of the face, lips, or tongue  · breathing problems  · general ill feeling or flu-like symptoms  · joint pain  · loss of appetite  · redness, blistering, peeling or loosening of the skin, including inside the mouth  · signs and symptoms of heart failure like breathing problems, fast, irregular heartbeat, sudden weight gain; swelling of the ankles, feet, hands; unusually weak or tired  · signs and symptoms of low blood sugar such as feeling anxious, confusion, dizziness, increased hunger, unusually weak or tired, sweating, shakiness, cold, irritable, headache, blurred vision, fast heartbeat, loss of consciousness  · signs and symptoms of muscle injury like dark urine; trouble passing urine or change in the amount of urine; unusually weak or tired; muscle pain; back pain  · unusual stomach upset or pain  · vomiting  Side effects that usually do not require medical attention (report to your doctor or health care professional if they continue or are bothersome):  · diarrhea  · headache  · sore throat  · stomach upset  · stuffy or runny nose  This list may not describe all possible side effects. Call your doctor for medical advice about side effects. You may report side effects to FDA at 4-182-FDA-8908.  Where should I keep my medicine?  Keep out of the reach of children.  Store at room temperature between 15 and 30 degrees C (59 and 86 degrees F). Throw away any unused medicine after the expiration date.  NOTE: This sheet is a summary. It may not cover all possible information. If you have questions about this medicine, talk to your doctor, pharmacist, or health care provider.  © 2020 [employer]/Gold Standard  (2019-07-23 16:38:43)

## 2020-10-01 NOTE — THERAPY
Occupational Therapy Contact Note    OT consult received. Per RN, pt has been SBA during ADLs/mobility. MRI negative per chart for stroke. Will defer OT evaluation at this time as pt appears to be near baseline, please reorder should OT eval be needed.    Laurie Bach, OTR/L

## 2020-10-01 NOTE — DISCHARGE PLANNING
[facility]itional [facility] Coordination     Referral from:  Dr. Page   Facesheet indicates: [place]   Potential Rehab Diagnosis: Stroke protocol    MRI negative for an acute event   Mobility and self care scores 24/24.        Physiatry consultation denied per protocol.              Thank you for the referral.

## 2020-10-01 NOTE — DISCHARGE SUMMARY
Discharge Summary    CHIEF COMPLAINT ON ADMISSION  Chief Complaint   Patient presents with   • Dizziness     x3 days.    • Numbness     L side. Intermittent x5 days.       Reason for Admission  LIGHTHEADED, DIZZINESS, LEFT SIDED*     Admission Date  9/30/2020    CODE STATUS  Full Code    HPI & HOSPITAL COURSE  This is a 39 y.o. female here with headache. MRI of the brain was negative. Trial of IV Mg, Compazine, and Benadryl was given with resolution of her headache. She has know history of Diabetes mellitus type 2. Her Diabetes was not controlled on her current medication of Metformin. She was advised she would need additional medication and follow up with her PCP for close monitoring of her Diabetes.         Therefore, she is discharged in good and stable condition to home with close outpatient follow-up.    The patient recovered much more quickly than anticipated on admission.    Discharge Date  10/1/2020    FOLLOW UP ITEMS POST DISCHARGE  Follow up with PCP    DISCHARGE DIAGNOSES  Principal Problem:    Headache POA: Yes  Active Problems:    Type 2 diabetes mellitus with hyperglycemia, without long-term current use of insulin (HCC) POA: Yes    Hypomagnesemia POA: Yes  Resolved Problems:    * No resolved hospital problems. *      FOLLOW UP  No future appointments.  No follow-up provider specified.    MEDICATIONS ON DISCHARGE     Medication List      START taking these medications      Instructions   SITagliptin 100 MG Tabs  Commonly known as: Januvia   Take 1 Tab by mouth every day.  Dose: 100 mg        CONTINUE taking these medications      Instructions   metformin 1000 MG tablet  Commonly known as: GLUCOPHAGE   Take 1 Tab by mouth 2 times a day, with meals.  Dose: 1,000 mg     simvastatin 5 MG Tabs  Commonly known as: ZOCOR   Take 1 Tab by mouth every evening.  Dose: 5 mg        STOP taking these medications    ibuprofen 800 MG Tabs  Commonly known as: MOTRIN            Allergies  Allergies   Allergen Reactions    • Septra [Sulfamethoxazole W-Trimethoprim] Rash     Body rash       DIET  Orders Placed This Encounter   Procedures   • Diet Order Diabetic     Standing Status:   Standing     Number of Occurrences:   1     Order Specific Question:   Diet:     Answer:   Diabetic [3]       ACTIVITY  As tolerated.  Weight bearing as tolerated    CONSULTATIONS  None    PROCEDURES  None    LABORATORY  Lab Results   Component Value Date    SODIUM 137 10/01/2020    POTASSIUM 3.8 10/01/2020    CHLORIDE 99 10/01/2020    CO2 24 10/01/2020    GLUCOSE 244 (H) 10/01/2020    BUN 13 10/01/2020    CREATININE 0.51 10/01/2020        Lab Results   Component Value Date    WBC 7.0 10/01/2020    HEMOGLOBIN 16.3 (H) 10/01/2020    HEMATOCRIT 46.1 10/01/2020    PLATELETCT 302 10/01/2020        Total time of the discharge process exceeds 30 minutes.

## 2020-10-01 NOTE — PROGRESS NOTES
Comments: not using Report received from day shift RN. Pt is in bed in lowest locked position with bed side table and call light within reach. Assessment performed. No further needs stated at this time. Pt is A&Ox4. RA. Pt is sitting comfortably in bed. Hourly rounding in place.

## 2020-10-01 NOTE — THERAPY
Physical Therapy   Screen     Patient Name: Bettina Burdick  Age:  39 y.o., Sex:  female  Medical Record #: [id number]  Today's Date: 10/1/2020     Assessment  Patient is 39 y.o. female with a diagnosis of left sided tingling, numbness, with HA.  Screen complete, per nursing chart patient at 24 clicks indicating no assist need for ambulation, stair ambulation, transfers or bed mobility.  Patient states she has no needs.  PT will not follow.      10/01/20 1415   Prior Living Situation   Prior Services None   Housing / Facility 1 Story House   Steps Into Home 0   Steps In Home 0   Lives with - Patient's Self Care Capacity Spouse   Prior Level of Functional Mobility   Bed Mobility Independent   Transfer Status Independent   Ambulation Independent   Distance Ambulation (Feet) 150   Assistive Devices Used None   Stairs Independent     Plan    Physical Therapy Screen Complete.

## 2023-01-31 ENCOUNTER — NON-PROVIDER VISIT (OUTPATIENT)
Dept: OCCUPATIONAL MEDICINE | Facility: CLINIC | Age: 43
End: 2023-01-31

## 2023-01-31 DIAGNOSIS — Z02.1 PRE-EMPLOYMENT DRUG SCREENING: ICD-10-CM

## 2023-02-07 ENCOUNTER — OFFICE VISIT (OUTPATIENT)
Dept: URGENT CARE | Facility: CLINIC | Age: 43
End: 2023-02-07

## 2023-02-07 VITALS
TEMPERATURE: 97.5 F | RESPIRATION RATE: 14 BRPM | DIASTOLIC BLOOD PRESSURE: 68 MMHG | HEIGHT: 62 IN | SYSTOLIC BLOOD PRESSURE: 110 MMHG | HEART RATE: 86 BPM | WEIGHT: 146 LBS | OXYGEN SATURATION: 99 % | BODY MASS INDEX: 26.87 KG/M2

## 2023-02-07 DIAGNOSIS — S05.01XA ABRASION OF RIGHT CORNEA, INITIAL ENCOUNTER: ICD-10-CM

## 2023-02-07 PROCEDURE — 99203 OFFICE O/P NEW LOW 30 MIN: CPT | Performed by: FAMILY MEDICINE

## 2023-02-07 RX ORDER — CIPROFLOXACIN HYDROCHLORIDE 3.5 MG/ML
1 SOLUTION/ DROPS TOPICAL 4 TIMES DAILY
Qty: 2 ML | Refills: 0 | Status: SHIPPED | OUTPATIENT
Start: 2023-02-07 | End: 2023-02-14

## 2023-02-07 ASSESSMENT — VISUAL ACUITY
OD_CC: 20/50
OS_CC: 20/40

## 2023-02-07 NOTE — PROGRESS NOTES
"Subjective:      Chief Complaint   Patient presents with    Eye Drainage     X 3 days on R eye.  She is having redness and draining.                Eye Problem   The rt eye is affected.   C/o redness, minor irritation, OD.        This is a new problem. The current episode started in the past 3 days.  There was no injury mechanism. The pain is mild. There is no known exposure to pink eye. Pt does not wear contacts. Associated symptoms include a foreign body sensation. Pertinent negatives include no blurred vision, eye discharge, double vision, eye redness, fever, nausea or photophobia. Pt has tried nothing for the symptoms.       Past Medical History:   Diagnosis Date    Diabetes     DIABETES MELLITUS 1/13/2012    Infectious disease     PID         Social History     Tobacco Use    Smoking status: Never    Smokeless tobacco: Never   Vaping Use    Vaping Use: Never used   Substance Use Topics    Alcohol use: Yes     Comment: occ    Drug use: No         Current Outpatient Medications on File Prior to Visit   Medication Sig Dispense Refill    SITagliptin (JANUVIA) 100 MG Tab Take 1 Tab by mouth every day. 30 Tab 2    metformin (GLUCOPHAGE) 1000 MG tablet Take 1 Tab by mouth 2 times a day, with meals. 60 Tab 3    simvastatin (ZOCOR) 5 MG Tab Take 1 Tab by mouth every evening. 30 Tab 11     No current facility-administered medications on file prior to visit.       Review of Systems   Constitutional: Negative for fever.   Eyes: Positive for pain. Negative for blurred vision, double vision, photophobia, discharge and redness.   Respiratory: Negative for shortness of breath.    Cardiovascular: Negative for chest pain.   Gastrointestinal: Negative for nausea.   Neurological: Negative for dizziness.   All other systems reviewed and are negative.         Objective:     /68   Pulse 86   Temp 36.4 °C (97.5 °F) (Temporal)   Resp 14   Ht 1.575 m (5' 2\")   Wt 66.2 kg (146 lb)   SpO2 99%     Physical Exam "   Constitutional: She is oriented to person, place, and time. She appears well-developed and well-nourished. No distress.   HENT:   Head: Normocephalic and atraumatic.   Eyes: EOM and lids are normal. Pupils are equal, round, and reactive to light. Lids are everted and swept, no foreign bodies found.  No periorbital or lid erythema or edema.     OS:    eye exhibits no discharge.   conjunctiva is injected (LLQ).   Fluorecin exam:       The rt eye shows corneal abrasion at lower outer quadrant.   Cardiovascular: Normal rate.    Pulmonary/Chest: Effort normal.   Neurological: pt is alert and oriented to person, place, and time.   Skin: Skin is warm. She is not diaphoretic. No erythema.   Nursing note and vitals reviewed.              Assessment/Plan:          1. Abrasion of right cornea, initial encounter     - ciprofloxacin (CILOXIN) 0.3 % Solution; Administer 1 Drop into the right eye 4 times a day for 7 days.  Dispense: 2 mL; Refill: 0    Follow up in one week if no improvement, sooner if symptoms worsen.

## 2023-02-07 NOTE — LETTER
February 7, 2023         Patient: Bettina Burdick   YOB: 1980   Date of Visit: 2/7/2023           To Whom it May Concern:    Bettina Burdick was seen in my clinic on 2/7/2023. She may return to work on Thursday.    If you have any questions or concerns, please don't hesitate to call.        Sincerely,           Ed Nelson M.D.  Electronically Signed      Statement Selected

## 2023-03-01 ENCOUNTER — HOSPITAL ENCOUNTER (EMERGENCY)
Facility: MEDICAL CENTER | Age: 43
End: 2023-03-01
Attending: EMERGENCY MEDICINE

## 2023-03-01 VITALS
OXYGEN SATURATION: 98 % | RESPIRATION RATE: 14 BRPM | SYSTOLIC BLOOD PRESSURE: 111 MMHG | TEMPERATURE: 98 F | BODY MASS INDEX: 27.02 KG/M2 | DIASTOLIC BLOOD PRESSURE: 75 MMHG | WEIGHT: 146.83 LBS | HEIGHT: 62 IN | HEART RATE: 95 BPM

## 2023-03-01 DIAGNOSIS — R07.0 THROAT PAIN: ICD-10-CM

## 2023-03-01 DIAGNOSIS — I88.9 CERVICAL LYMPHADENITIS: ICD-10-CM

## 2023-03-01 LAB
FLUAV RNA SPEC QL NAA+PROBE: NEGATIVE
FLUBV RNA SPEC QL NAA+PROBE: NEGATIVE
GLUCOSE BLD STRIP.AUTO-MCNC: 318 MG/DL (ref 65–99)
RSV RNA SPEC QL NAA+PROBE: NEGATIVE
S PYO DNA SPEC NAA+PROBE: NOT DETECTED
SARS-COV-2 RNA RESP QL NAA+PROBE: NOTDETECTED
SPECIMEN SOURCE: NORMAL

## 2023-03-01 PROCEDURE — 99284 EMERGENCY DEPT VISIT MOD MDM: CPT

## 2023-03-01 PROCEDURE — 0241U HCHG SARS-COV-2 COVID-19 NFCT DS RESP RNA 4 TRGT MIC: CPT

## 2023-03-01 PROCEDURE — 87651 STREP A DNA AMP PROBE: CPT

## 2023-03-01 PROCEDURE — 96372 THER/PROPH/DIAG INJ SC/IM: CPT

## 2023-03-01 PROCEDURE — C9803 HOPD COVID-19 SPEC COLLECT: HCPCS | Performed by: EMERGENCY MEDICINE

## 2023-03-01 PROCEDURE — 700111 HCHG RX REV CODE 636 W/ 250 OVERRIDE (IP): Performed by: EMERGENCY MEDICINE

## 2023-03-01 PROCEDURE — 82962 GLUCOSE BLOOD TEST: CPT

## 2023-03-01 PROCEDURE — 700102 HCHG RX REV CODE 250 W/ 637 OVERRIDE(OP): Performed by: EMERGENCY MEDICINE

## 2023-03-01 PROCEDURE — A9270 NON-COVERED ITEM OR SERVICE: HCPCS | Performed by: EMERGENCY MEDICINE

## 2023-03-01 RX ORDER — AMOXICILLIN AND CLAVULANATE POTASSIUM 875; 125 MG/1; MG/1
1 TABLET, FILM COATED ORAL ONCE
Status: COMPLETED | OUTPATIENT
Start: 2023-03-01 | End: 2023-03-01

## 2023-03-01 RX ORDER — AMOXICILLIN AND CLAVULANATE POTASSIUM 875; 125 MG/1; MG/1
1 TABLET, FILM COATED ORAL 2 TIMES DAILY
Qty: 14 TABLET | Refills: 0 | Status: ACTIVE | OUTPATIENT
Start: 2023-03-01 | End: 2023-03-08

## 2023-03-01 RX ORDER — KETOROLAC TROMETHAMINE 30 MG/ML
60 INJECTION, SOLUTION INTRAMUSCULAR; INTRAVENOUS ONCE
Status: COMPLETED | OUTPATIENT
Start: 2023-03-01 | End: 2023-03-01

## 2023-03-01 RX ADMIN — KETOROLAC TROMETHAMINE 60 MG: 30 INJECTION, SOLUTION INTRAMUSCULAR; INTRAVENOUS at 09:08

## 2023-03-01 RX ADMIN — AMOXICILLIN AND CLAVULANATE POTASSIUM 1 TABLET: 875; 125 TABLET, FILM COATED ORAL at 09:09

## 2023-03-01 NOTE — ED PROVIDER NOTES
ED Provider Note    CHIEF COMPLAINT  Chief Complaint   Patient presents with    Sore Throat     X 2 weeks. Also reports painful swallowing. Airway patent. Speaking in full sentences. Able to manage secretions.    Cough     C/o cough x 1 month. Reports yellowish sputum. Denies blood.       LIMITATION TO HISTORY   Select: none    HPI    Bettina Burdick is a 42 y.o. female who presents to the Emergency Department complaining of sore throat.  Really 2 weeks.  Got worse over the last 24 hours pain over her Weber apple.  Worse when she swallows.  There is no alleviating factors.  It has been intermittent now worse.  There is no associated drooling no difficulty shortness of breath etc. sometimes she coughs so much she feels like she is short of breath.  So the cough for 1 month.  Bloody sputum.  No associated fever chills    Patient is a diabetic she is like to check her Accu-Cheks very much she has chronic nocturnal urinary symptoms.  She states that is chronic.  Patient states been sick for the last month but has not been able to shake it.  Here now for further evaluation.    OUTSIDE HISTORIAN(S):  Select: There are no history    EXTERNAL RECORDS REVIEWED  Select: Other the chart.  She admitted for dizziness MRI head CT was negative chest pain work-up negative.  Type 2 diabetes.    REVIEW OF SYSTEMS  : No fevers or chills  Ear nose throat: No ear pain no sinus pressure no significant nasal discharge  Pulmonary cough some shortness of breath  Cardiovascular no chest pressure  GI no nausea vomiting no diarrhea  Dermatologic no rashes or abrasions  PAST MEDICAL HISTORY  Past Medical History:   Diagnosis Date    Diabetes     DIABETES MELLITUS 1/13/2012    Infectious disease     PID       FAMILY HISTORY  Family History   Problem Relation Age of Onset    Diabetes Mother     Diabetes Maternal Grandmother     Diabetes Maternal Grandfather     Heart Disease Neg Hx     Hypertension Neg Hx     Hyperlipidemia Neg Hx      "Stroke Neg Hx     Cancer Neg Hx        SOCIAL HISTORY  Social History     Tobacco Use    Smoking status: Never    Smokeless tobacco: Never   Vaping Use    Vaping Use: Never used   Substance Use Topics    Alcohol use: Yes     Comment: occ    Drug use: No     Social History     Substance and Sexual Activity   Drug Use No       SURGICAL HISTORY  Past Surgical History:   Procedure Laterality Date    CHOLECYSTECTOMY      OTHER      OTHER ABDOMINAL SURGERY      ned 2005       CURRENT MEDICATIONS    Current Facility-Administered Medications:     amoxicillin-clavulanate (AUGMENTIN) 875-125 MG per tablet 1 Tablet, 1 Tablet, Oral, Once, Zhen Martin M.D.    ketorolac (TORADOL) injection 60 mg, 60 mg, Intramuscular, Once, Zhen Martin M.D.    Current Outpatient Medications:     SITagliptin (JANUVIA) 100 MG Tab, Take 1 Tab by mouth every day., Disp: 30 Tab, Rfl: 2    metformin (GLUCOPHAGE) 1000 MG tablet, Take 1 Tab by mouth 2 times a day, with meals., Disp: 60 Tab, Rfl: 3    simvastatin (ZOCOR) 5 MG Tab, Take 1 Tab by mouth every evening., Disp: 30 Tab, Rfl: 11    ALLERGIES  Allergies   Allergen Reactions    Septra [Sulfamethoxazole W-Trimethoprim] Rash     Body rash       PHYSICAL EXAM  VITAL SIGNS: /89   Pulse (!) 106   Temp 36.2 °C (97.1 °F) (Temporal)   Resp 18   Ht 1.575 m (5' 2\")   Wt 66.6 kg (146 lb 13.2 oz)   LMP 02/25/2023   SpO2 98%   BMI 26.85 kg/m²   Reviewed and see tachycardia  Constitutional: Well developed, Well nourished, mildly tachycardic.  HENT: Normocephalic, atraumatic, bilateral external ears normal, No intraoral erythema, edema, exudate posterior oropharynx cobblestoning.  Tympanic membranes are clear bilaterally.  There is no sinus tenderness.  Eyes: PERRLA, conjunctiva pink, no scleral icterus.   Cardiovascular: Regular rate and rhythm. No murmurs, rubs or gallops.  No dependent edema or calf tenderness  Respiratory: No respiratory distress no stridor    Skin: No " erythema, no rash. No wounds or bruising.  Genitourinary: No costovertebral angle tenderness.   Musculoskeletal: no deformities.  She has some difficulty when able to move her neck up and down but she has good range of motion  Neurologic: Moves all extremities no facial droop  Psychiatric: Slightly flat affect    LABS Ordered and Reviewed by Me:  Results for orders placed or performed during the hospital encounter of 03/01/23   Group A Strep by PCR    Specimen: Throat   Result Value Ref Range    Group A Strep by PCR Not Detected Not Detected   CoV-2, FLU A/B, and RSV by PCR (2-4 Hours CEPHEID) : Collect NP swab in VTM    Specimen: Respirate   Result Value Ref Range    Influenza virus A RNA Negative Negative    Influenza virus B, PCR Negative Negative    RSV, PCR Negative Negative    SARS-CoV-2 by PCR NotDetected     SARS-CoV-2 Source NP Swab    POCT glucose device results   Result Value Ref Range    POC Glucose, Blood 318 (H) 65 - 99 mg/dL           ED COURSE:    ED Observation Status? Yes; Patient placed in observation status at 8:52 AM 03/01/23     Observation plan is as follows: Medications below  Response to medications  Rapid testing      INTERVENTIONS BY ME:  Medications   amoxicillin-clavulanate (AUGMENTIN) 875-125 MG per tablet 1 Tablet (has no administration in time range)   ketorolac (TORADOL) injection 60 mg (has no administration in time range)       Response on recheck: Feeling much better..    11:32 AM- Patient reassessed and and feels better.    I have discussed management of the patient with the following physicians and sources: No other person was spoken to.  The patient was reassessed.  She feels improved.    Patient discharged from ED observation at 11:32 AM 03/01/23 is feeling better looks rested.  Sugar was noted be 318.  Negative for strep and flu and COVID..    MEDICAL DECISION MAKING:  PROBLEMS EVALUATED THIS VISIT:  Neck pain.  At this point she has some significant lymph cervical  lymphadenopathy.  She is in pain upon moving her neck up and down.  There is always a pot chance of deep cellulitic infection patient has a history of diabetes sick for 1 month now getting worse.  Her sinuses not tender to only she has sinusitis at this time.  Strep test is Pap is pending.  COVID is pending.  At this point because of her diabetes and poor control I am worried more by deep tissue infection than your normal patient.  We will put her on a short course of Augmentin.  This point I will see how she does with pain management and see her tachycardia is resolved.  Also Accu-Chek to see her sugar.    RISK:  Always get worse and have deep tissue infection patient be discharged home.    Diagnostic tests and prescription drugs considered including, but not limited to: Select: 10..    Escalation of care considered, and ultimately not performed: Received pain medication antibiotics.    Barriers to care at this time, including but not limited to: Select: None.      PLAN:  New Prescriptions    AMOXICILLIN-CLAVULANATE (AUGMENTIN) 875-125 MG TAB    Take 1 Tablet by mouth 2 times a day for 7 days.       Patient was discharged home told to return symptoms worsen.    Lymphadenitis handout given        Followup:  [facility], Emergency Dept  [address] [facility] [address] [phone]  Go to   If symptoms worsen      CONDITION: Improved.     FINAL IMPRESSION  1. Cervical lymphadenitis    2. Throat pain       ED Observation Care

## 2023-03-01 NOTE — ED TRIAGE NOTES
Chief Complaint   Patient presents with    Sore Throat     X 2 weeks. Also reports painful swallowing. Airway patent. Speaking in full sentences. Able to manage secretions.    Cough     C/o cough x 1 month. Reports yellowish sputum. Denies blood.     Pt wearing mask upon arrival in triage.

## 2023-03-14 ENCOUNTER — HOSPITAL ENCOUNTER (OUTPATIENT)
Facility: MEDICAL CENTER | Age: 43
End: 2023-03-14
Attending: PATHOLOGY
Payer: COMMERCIAL

## 2023-03-14 LAB — NT-PROBNP SERPL IA-MCNC: 29 PG/ML (ref 0–125)

## 2024-07-09 ENCOUNTER — APPOINTMENT (OUTPATIENT)
Dept: RADIOLOGY | Facility: MEDICAL CENTER | Age: 44
End: 2024-07-09
Attending: EMERGENCY MEDICINE

## 2024-07-09 ENCOUNTER — APPOINTMENT (OUTPATIENT)
Dept: RADIOLOGY | Facility: MEDICAL CENTER | Age: 44
End: 2024-07-09
Attending: HOSPITALIST

## 2024-07-09 ENCOUNTER — APPOINTMENT (OUTPATIENT)
Dept: RADIOLOGY | Facility: MEDICAL CENTER | Age: 44
End: 2024-07-09

## 2024-07-09 ENCOUNTER — HOSPITAL ENCOUNTER (OUTPATIENT)
Facility: MEDICAL CENTER | Age: 44
End: 2024-07-09
Attending: EMERGENCY MEDICINE | Admitting: HOSPITALIST

## 2024-07-09 ENCOUNTER — APPOINTMENT (OUTPATIENT)
Dept: CARDIOLOGY | Facility: MEDICAL CENTER | Age: 44
End: 2024-07-09
Attending: HOSPITALIST

## 2024-07-09 ENCOUNTER — PHARMACY VISIT (OUTPATIENT)
Dept: PHARMACY | Facility: MEDICAL CENTER | Age: 44
End: 2024-07-09
Payer: COMMERCIAL

## 2024-07-09 VITALS
TEMPERATURE: 97.7 F | RESPIRATION RATE: 16 BRPM | HEIGHT: 62 IN | BODY MASS INDEX: 27.43 KG/M2 | DIASTOLIC BLOOD PRESSURE: 56 MMHG | HEART RATE: 98 BPM | OXYGEN SATURATION: 96 % | WEIGHT: 149.03 LBS | SYSTOLIC BLOOD PRESSURE: 102 MMHG

## 2024-07-09 DIAGNOSIS — I10 PRIMARY HYPERTENSION: ICD-10-CM

## 2024-07-09 DIAGNOSIS — E11.65 TYPE 2 DIABETES MELLITUS WITH HYPERGLYCEMIA, WITHOUT LONG-TERM CURRENT USE OF INSULIN (HCC): ICD-10-CM

## 2024-07-09 DIAGNOSIS — R07.9 ACUTE CHEST PAIN: ICD-10-CM

## 2024-07-09 DIAGNOSIS — R73.9 HYPERGLYCEMIA: ICD-10-CM

## 2024-07-09 PROBLEM — R21 RASH: Status: ACTIVE | Noted: 2024-07-09

## 2024-07-09 LAB
ALBUMIN SERPL BCP-MCNC: 3.5 G/DL (ref 3.2–4.9)
ALBUMIN/GLOB SERPL: 1.1 G/DL
ALP SERPL-CCNC: 81 U/L (ref 30–99)
ALT SERPL-CCNC: 12 U/L (ref 2–50)
ANION GAP SERPL CALC-SCNC: 13 MMOL/L (ref 7–16)
AST SERPL-CCNC: 11 U/L (ref 12–45)
BASOPHILS # BLD AUTO: 0.6 % (ref 0–1.8)
BASOPHILS # BLD: 0.03 K/UL (ref 0–0.12)
BILIRUB SERPL-MCNC: 0.3 MG/DL (ref 0.1–1.5)
BUN SERPL-MCNC: 12 MG/DL (ref 8–22)
CALCIUM ALBUM COR SERPL-MCNC: 9.3 MG/DL (ref 8.5–10.5)
CALCIUM SERPL-MCNC: 8.9 MG/DL (ref 8.5–10.5)
CHLORIDE SERPL-SCNC: 96 MMOL/L (ref 96–112)
CO2 SERPL-SCNC: 21 MMOL/L (ref 20–33)
CREAT SERPL-MCNC: 0.64 MG/DL (ref 0.5–1.4)
EKG IMPRESSION: NORMAL
EOSINOPHIL # BLD AUTO: 0.09 K/UL (ref 0–0.51)
EOSINOPHIL NFR BLD: 1.7 % (ref 0–6.9)
ERYTHROCYTE [DISTWIDTH] IN BLOOD BY AUTOMATED COUNT: 38.1 FL (ref 35.9–50)
GFR SERPLBLD CREATININE-BSD FMLA CKD-EPI: 112 ML/MIN/1.73 M 2
GLOBULIN SER CALC-MCNC: 3.1 G/DL (ref 1.9–3.5)
GLUCOSE BLD STRIP.AUTO-MCNC: 361 MG/DL (ref 65–99)
GLUCOSE SERPL-MCNC: 573 MG/DL (ref 65–99)
HCG SERPL QL: NEGATIVE
HCT VFR BLD AUTO: 36.6 % (ref 37–47)
HGB BLD-MCNC: 12.4 G/DL (ref 12–16)
IMM GRANULOCYTES # BLD AUTO: 0.02 K/UL (ref 0–0.11)
IMM GRANULOCYTES NFR BLD AUTO: 0.4 % (ref 0–0.9)
LV EJECT FRACT  99904: 5
LV EJECT FRACT MOD 2C 99903: 53.4
LV EJECT FRACT MOD 4C 99902: 66.05
LV EJECT FRACT MOD BP 99901: 60.66
LYMPHOCYTES # BLD AUTO: 1.38 K/UL (ref 1–4.8)
LYMPHOCYTES NFR BLD: 26.4 % (ref 22–41)
MCH RBC QN AUTO: 25.3 PG (ref 27–33)
MCHC RBC AUTO-ENTMCNC: 33.9 G/DL (ref 32.2–35.5)
MCV RBC AUTO: 74.5 FL (ref 81.4–97.8)
MONOCYTES # BLD AUTO: 0.35 K/UL (ref 0–0.85)
MONOCYTES NFR BLD AUTO: 6.7 % (ref 0–13.4)
NEUTROPHILS # BLD AUTO: 3.36 K/UL (ref 1.82–7.42)
NEUTROPHILS NFR BLD: 64.2 % (ref 44–72)
NRBC # BLD AUTO: 0 K/UL
NRBC BLD-RTO: 0 /100 WBC (ref 0–0.2)
PLATELET # BLD AUTO: 254 K/UL (ref 164–446)
PMV BLD AUTO: 11.9 FL (ref 9–12.9)
POTASSIUM SERPL-SCNC: 3.9 MMOL/L (ref 3.6–5.5)
PROT SERPL-MCNC: 6.6 G/DL (ref 6–8.2)
RBC # BLD AUTO: 4.91 M/UL (ref 4.2–5.4)
SODIUM SERPL-SCNC: 130 MMOL/L (ref 135–145)
TROPONIN T SERPL-MCNC: 11 NG/L (ref 6–19)
TROPONIN T SERPL-MCNC: 11 NG/L (ref 6–19)
WBC # BLD AUTO: 5.2 K/UL (ref 4.8–10.8)

## 2024-07-09 PROCEDURE — 99236 HOSP IP/OBS SAME DATE HI 85: CPT | Performed by: HOSPITALIST

## 2024-07-09 PROCEDURE — 80053 COMPREHEN METABOLIC PANEL: CPT

## 2024-07-09 PROCEDURE — A9502 TC99M TETROFOSMIN: HCPCS

## 2024-07-09 PROCEDURE — RXMED WILLOW AMBULATORY MEDICATION CHARGE

## 2024-07-09 PROCEDURE — 93306 TTE W/DOPPLER COMPLETE: CPT | Mod: 26 | Performed by: INTERNAL MEDICINE

## 2024-07-09 PROCEDURE — 82962 GLUCOSE BLOOD TEST: CPT

## 2024-07-09 PROCEDURE — 700102 HCHG RX REV CODE 250 W/ 637 OVERRIDE(OP): Performed by: HOSPITALIST

## 2024-07-09 PROCEDURE — 85025 COMPLETE CBC W/AUTO DIFF WBC: CPT

## 2024-07-09 PROCEDURE — 36415 COLL VENOUS BLD VENIPUNCTURE: CPT

## 2024-07-09 PROCEDURE — 84484 ASSAY OF TROPONIN QUANT: CPT | Mod: 91

## 2024-07-09 PROCEDURE — 700105 HCHG RX REV CODE 258: Performed by: EMERGENCY MEDICINE

## 2024-07-09 PROCEDURE — 93005 ELECTROCARDIOGRAM TRACING: CPT

## 2024-07-09 PROCEDURE — 96372 THER/PROPH/DIAG INJ SC/IM: CPT

## 2024-07-09 PROCEDURE — A9270 NON-COVERED ITEM OR SERVICE: HCPCS | Performed by: HOSPITALIST

## 2024-07-09 PROCEDURE — 96374 THER/PROPH/DIAG INJ IV PUSH: CPT

## 2024-07-09 PROCEDURE — G0378 HOSPITAL OBSERVATION PER HR: HCPCS

## 2024-07-09 PROCEDURE — 84703 CHORIONIC GONADOTROPIN ASSAY: CPT

## 2024-07-09 PROCEDURE — 99285 EMERGENCY DEPT VISIT HI MDM: CPT

## 2024-07-09 PROCEDURE — 71045 X-RAY EXAM CHEST 1 VIEW: CPT

## 2024-07-09 PROCEDURE — 700111 HCHG RX REV CODE 636 W/ 250 OVERRIDE (IP): Mod: JZ | Performed by: EMERGENCY MEDICINE

## 2024-07-09 PROCEDURE — 93005 ELECTROCARDIOGRAM TRACING: CPT | Performed by: EMERGENCY MEDICINE

## 2024-07-09 PROCEDURE — 96375 TX/PRO/DX INJ NEW DRUG ADDON: CPT

## 2024-07-09 PROCEDURE — 93306 TTE W/DOPPLER COMPLETE: CPT

## 2024-07-09 RX ORDER — IBUPROFEN 600 MG/1
600 TABLET ORAL
COMMUNITY

## 2024-07-09 RX ORDER — PROMETHAZINE HYDROCHLORIDE 25 MG/1
12.5-25 SUPPOSITORY RECTAL EVERY 4 HOURS PRN
Status: DISCONTINUED | OUTPATIENT
Start: 2024-07-09 | End: 2024-07-09 | Stop reason: HOSPADM

## 2024-07-09 RX ORDER — ENOXAPARIN SODIUM 100 MG/ML
40 INJECTION SUBCUTANEOUS DAILY
Status: DISCONTINUED | OUTPATIENT
Start: 2024-07-09 | End: 2024-07-09 | Stop reason: HOSPADM

## 2024-07-09 RX ORDER — ONDANSETRON 2 MG/ML
4 INJECTION INTRAMUSCULAR; INTRAVENOUS EVERY 4 HOURS PRN
Status: DISCONTINUED | OUTPATIENT
Start: 2024-07-09 | End: 2024-07-09 | Stop reason: HOSPADM

## 2024-07-09 RX ORDER — REGADENOSON 0.08 MG/ML
0.4 INJECTION, SOLUTION INTRAVENOUS ONCE
Status: DISCONTINUED | OUTPATIENT
Start: 2024-07-09 | End: 2024-07-09 | Stop reason: HOSPADM

## 2024-07-09 RX ORDER — DEXTROSE MONOHYDRATE 25 G/50ML
25 INJECTION, SOLUTION INTRAVENOUS
Status: DISCONTINUED | OUTPATIENT
Start: 2024-07-09 | End: 2024-07-09 | Stop reason: HOSPADM

## 2024-07-09 RX ORDER — SODIUM CHLORIDE, SODIUM LACTATE, POTASSIUM CHLORIDE, CALCIUM CHLORIDE 600; 310; 30; 20 MG/100ML; MG/100ML; MG/100ML; MG/100ML
1000 INJECTION, SOLUTION INTRAVENOUS ONCE
Status: COMPLETED | OUTPATIENT
Start: 2024-07-09 | End: 2024-07-09

## 2024-07-09 RX ORDER — OMEPRAZOLE 20 MG/1
20 CAPSULE, DELAYED RELEASE ORAL DAILY
Qty: 30 CAPSULE | Refills: 0 | Status: SHIPPED | OUTPATIENT
Start: 2024-07-09 | End: 2024-08-08

## 2024-07-09 RX ORDER — LISINOPRIL 10 MG/1
10 TABLET ORAL DAILY
Qty: 30 TABLET | Refills: 0 | Status: SHIPPED | OUTPATIENT
Start: 2024-07-10

## 2024-07-09 RX ORDER — HYDRALAZINE HYDROCHLORIDE 20 MG/ML
10 INJECTION INTRAMUSCULAR; INTRAVENOUS EVERY 4 HOURS PRN
Status: DISCONTINUED | OUTPATIENT
Start: 2024-07-09 | End: 2024-07-09 | Stop reason: HOSPADM

## 2024-07-09 RX ORDER — AMINOPHYLLINE 25 MG/ML
100 INJECTION, SOLUTION INTRAVENOUS
Status: DISCONTINUED | OUTPATIENT
Start: 2024-07-09 | End: 2024-07-09 | Stop reason: HOSPADM

## 2024-07-09 RX ORDER — MORPHINE SULFATE 4 MG/ML
4 INJECTION INTRAVENOUS ONCE
Status: COMPLETED | OUTPATIENT
Start: 2024-07-09 | End: 2024-07-09

## 2024-07-09 RX ORDER — OMEPRAZOLE 20 MG/1
20 CAPSULE, DELAYED RELEASE ORAL 2 TIMES DAILY
Status: DISCONTINUED | OUTPATIENT
Start: 2024-07-09 | End: 2024-07-09 | Stop reason: HOSPADM

## 2024-07-09 RX ORDER — LISINOPRIL 10 MG/1
10 TABLET ORAL
Status: DISCONTINUED | OUTPATIENT
Start: 2024-07-09 | End: 2024-07-09 | Stop reason: HOSPADM

## 2024-07-09 RX ORDER — SIMVASTATIN 10 MG
5 TABLET ORAL EVERY EVENING
Status: DISCONTINUED | OUTPATIENT
Start: 2024-07-09 | End: 2024-07-09

## 2024-07-09 RX ORDER — REGADENOSON 0.08 MG/ML
INJECTION, SOLUTION INTRAVENOUS
Status: DISCONTINUED
Start: 2024-07-09 | End: 2024-07-09 | Stop reason: HOSPADM

## 2024-07-09 RX ORDER — KETOROLAC TROMETHAMINE 15 MG/ML
15 INJECTION, SOLUTION INTRAMUSCULAR; INTRAVENOUS ONCE
Status: COMPLETED | OUTPATIENT
Start: 2024-07-09 | End: 2024-07-09

## 2024-07-09 RX ORDER — GLYBURIDE 5 MG/1
5 TABLET ORAL
COMMUNITY

## 2024-07-09 RX ORDER — PROCHLORPERAZINE EDISYLATE 5 MG/ML
5-10 INJECTION INTRAMUSCULAR; INTRAVENOUS EVERY 4 HOURS PRN
Status: DISCONTINUED | OUTPATIENT
Start: 2024-07-09 | End: 2024-07-09 | Stop reason: HOSPADM

## 2024-07-09 RX ORDER — PROMETHAZINE HYDROCHLORIDE 25 MG/1
12.5-25 TABLET ORAL EVERY 4 HOURS PRN
Status: DISCONTINUED | OUTPATIENT
Start: 2024-07-09 | End: 2024-07-09 | Stop reason: HOSPADM

## 2024-07-09 RX ORDER — ONDANSETRON 4 MG/1
4 TABLET, ORALLY DISINTEGRATING ORAL EVERY 4 HOURS PRN
Status: DISCONTINUED | OUTPATIENT
Start: 2024-07-09 | End: 2024-07-09 | Stop reason: HOSPADM

## 2024-07-09 RX ADMIN — LISINOPRIL 10 MG: 10 TABLET ORAL at 11:13

## 2024-07-09 RX ADMIN — MORPHINE SULFATE 4 MG: 4 INJECTION INTRAVENOUS at 10:01

## 2024-07-09 RX ADMIN — INSULIN HUMAN 12 UNITS: 100 INJECTION, SOLUTION PARENTERAL at 11:50

## 2024-07-09 RX ADMIN — OMEPRAZOLE 20 MG: 20 CAPSULE, DELAYED RELEASE ORAL at 11:12

## 2024-07-09 RX ADMIN — KETOROLAC TROMETHAMINE 15 MG: 15 INJECTION, SOLUTION INTRAMUSCULAR; INTRAVENOUS at 10:02

## 2024-07-09 RX ADMIN — SODIUM CHLORIDE, POTASSIUM CHLORIDE, SODIUM LACTATE AND CALCIUM CHLORIDE 1000 ML: 600; 310; 30; 20 INJECTION, SOLUTION INTRAVENOUS at 10:01

## 2024-07-09 SDOH — ECONOMIC STABILITY: TRANSPORTATION INSECURITY
IN THE PAST 12 MONTHS, HAS LACK OF RELIABLE TRANSPORTATION KEPT YOU FROM MEDICAL APPOINTMENTS, MEETINGS, WORK OR FROM GETTING THINGS NEEDED FOR DAILY LIVING?: NO

## 2024-07-09 SDOH — ECONOMIC STABILITY: TRANSPORTATION INSECURITY
IN THE PAST 12 MONTHS, HAS THE LACK OF TRANSPORTATION KEPT YOU FROM MEDICAL APPOINTMENTS OR FROM GETTING MEDICATIONS?: NO

## 2024-07-09 ASSESSMENT — ENCOUNTER SYMPTOMS
CONSTITUTIONAL NEGATIVE: 1
TREMORS: 0
DEPRESSION: 0
MYALGIAS: 0
NAUSEA: 0
EYES NEGATIVE: 1
ABDOMINAL PAIN: 0
SENSORY CHANGE: 0
GASTROINTESTINAL NEGATIVE: 1
NECK PAIN: 0
SPEECH CHANGE: 0
FOCAL WEAKNESS: 0
VOMITING: 0
RESPIRATORY NEGATIVE: 1
BACK PAIN: 0
HALLUCINATIONS: 0
DIZZINESS: 0
HEARTBURN: 0

## 2024-07-09 ASSESSMENT — LIFESTYLE VARIABLES
HAVE PEOPLE ANNOYED YOU BY CRITICIZING YOUR DRINKING: NO
TOTAL SCORE: 0
AVERAGE NUMBER OF DAYS PER WEEK YOU HAVE A DRINK CONTAINING ALCOHOL: 0
HOW MANY TIMES IN THE PAST YEAR HAVE YOU HAD 5 OR MORE DRINKS IN A DAY: 0
ALCOHOL_USE: NO
TOTAL SCORE: 0
SUBSTANCE_ABUSE: 0
CONSUMPTION TOTAL: NEGATIVE
TOTAL SCORE: 0
HAVE YOU EVER FELT YOU SHOULD CUT DOWN ON YOUR DRINKING: NO
ON A TYPICAL DAY WHEN YOU DRINK ALCOHOL HOW MANY DRINKS DO YOU HAVE: 0
DOES PATIENT WANT TO STOP DRINKING: NO
EVER FELT BAD OR GUILTY ABOUT YOUR DRINKING: NO
EVER HAD A DRINK FIRST THING IN THE MORNING TO STEADY YOUR NERVES TO GET RID OF A HANGOVER: NO
DO YOU DRINK ALCOHOL: NO

## 2024-07-09 ASSESSMENT — SOCIAL DETERMINANTS OF HEALTH (SDOH)
WITHIN THE LAST YEAR, HAVE YOU BEEN AFRAID OF YOUR PARTNER OR EX-PARTNER?: PATIENT DECLINED
WITHIN THE PAST 12 MONTHS, THE FOOD YOU BOUGHT JUST DIDN'T LAST AND YOU DIDN'T HAVE MONEY TO GET MORE: NEVER TRUE
WITHIN THE LAST YEAR, HAVE TO BEEN RAPED OR FORCED TO HAVE ANY KIND OF SEXUAL ACTIVITY BY YOUR PARTNER OR EX-PARTNER?: PATIENT DECLINED
WITHIN THE LAST YEAR, HAVE YOU BEEN HUMILIATED OR EMOTIONALLY ABUSED IN OTHER WAYS BY YOUR PARTNER OR EX-PARTNER?: PATIENT DECLINED
IN THE PAST 12 MONTHS, HAS THE ELECTRIC, GAS, OIL, OR WATER COMPANY THREATENED TO SHUT OFF SERVICE IN YOUR HOME?: NO
WITHIN THE LAST YEAR, HAVE YOU BEEN KICKED, HIT, SLAPPED, OR OTHERWISE PHYSICALLY HURT BY YOUR PARTNER OR EX-PARTNER?: PATIENT DECLINED
WITHIN THE PAST 12 MONTHS, YOU WORRIED THAT YOUR FOOD WOULD RUN OUT BEFORE YOU GOT THE MONEY TO BUY MORE: NEVER TRUE

## 2024-07-09 ASSESSMENT — HEART SCORE
HISTORY: MODERATELY SUSPICIOUS
RISK FACTORS: 1-2 RISK FACTORS
ECG: NORMAL
HEART SCORE: 2
AGE: <45
TROPONIN: LESS THAN OR EQUAL TO NORMAL LIMIT

## 2024-07-09 ASSESSMENT — PATIENT HEALTH QUESTIONNAIRE - PHQ9
1. LITTLE INTEREST OR PLEASURE IN DOING THINGS: NOT AT ALL
2. FEELING DOWN, DEPRESSED, IRRITABLE, OR HOPELESS: NOT AT ALL
SUM OF ALL RESPONSES TO PHQ9 QUESTIONS 1 AND 2: 0

## 2024-07-09 ASSESSMENT — FIBROSIS 4 INDEX: FIB4 SCORE: 0.54

## 2024-07-09 ASSESSMENT — PAIN DESCRIPTION - PAIN TYPE
TYPE: ACUTE PAIN
TYPE: ACUTE PAIN

## 2024-09-19 ENCOUNTER — OFFICE VISIT (OUTPATIENT)
Dept: OCCUPATIONAL MEDICINE | Facility: CLINIC | Age: 44
End: 2024-09-19

## 2024-09-19 ENCOUNTER — NON-PROVIDER VISIT (OUTPATIENT)
Dept: OCCUPATIONAL MEDICINE | Facility: CLINIC | Age: 44
End: 2024-09-19

## 2024-09-19 ENCOUNTER — HOSPITAL ENCOUNTER (OUTPATIENT)
Facility: MEDICAL CENTER | Age: 44
End: 2024-09-19
Attending: PREVENTIVE MEDICINE
Payer: COMMERCIAL

## 2024-09-19 DIAGNOSIS — Z02.1 PRE-EMPLOYMENT HEALTH SCREENING EXAMINATION: ICD-10-CM

## 2024-09-19 DIAGNOSIS — Z02.89 ENCOUNTER FOR OCCUPATIONAL HEALTH EXAMINATION: ICD-10-CM

## 2024-09-19 DIAGNOSIS — Z02.89 ENCOUNTER FOR OCCUPATIONAL HEALTH EXAMINATION: Primary | ICD-10-CM

## 2024-09-19 LAB
AMP AMPHETAMINE: NORMAL
BAR BARBITURATES: NORMAL
BZO BENZODIAZEPINES: NORMAL
COC COCAINE: NORMAL
INT CON NEG: NORMAL
INT CON POS: NORMAL
MDMA ECSTASY: NORMAL
MET METHAMPHETAMINES: NORMAL
MTD METHADONE: NORMAL
OPI OPIATES: NORMAL
OXY OXYCODONE: NORMAL
PCP PHENCYCLIDINE: NORMAL
POC URINE DRUG SCREEN OCDRS: NORMAL
THC: NORMAL

## 2024-09-19 PROCEDURE — 90707 MMR VACCINE SC: CPT | Mod: JZ | Performed by: PREVENTIVE MEDICINE

## 2024-09-19 PROCEDURE — 86787 VARICELLA-ZOSTER ANTIBODY: CPT | Performed by: PREVENTIVE MEDICINE

## 2024-09-19 PROCEDURE — 8915 PR COMPREHENSIVE PHYSICAL: Performed by: NURSE PRACTITIONER

## 2024-09-19 PROCEDURE — 80305 DRUG TEST PRSMV DIR OPT OBS: CPT | Performed by: PREVENTIVE MEDICINE

## 2024-09-19 PROCEDURE — 86480 TB TEST CELL IMMUN MEASURE: CPT | Performed by: PREVENTIVE MEDICINE

## 2024-09-19 PROCEDURE — 90746 HEPB VACCINE 3 DOSE ADULT IM: CPT | Performed by: PREVENTIVE MEDICINE

## 2024-09-20 LAB
GAMMA INTERFERON BACKGROUND BLD IA-ACNC: 0.03 IU/ML
M TB IFN-G BLD-IMP: POSITIVE
M TB IFN-G CD4+ BCKGRND COR BLD-ACNC: 0.45 IU/ML
MITOGEN IGNF BCKGRD COR BLD-ACNC: >10 IU/ML
QFT TB2 - NIL TBQ2: 0.5 IU/ML

## 2024-09-21 LAB — VZV IGG SER IA-ACNC: 568.5 IV

## 2024-10-10 ENCOUNTER — APPOINTMENT (OUTPATIENT)
Dept: OCCUPATIONAL MEDICINE | Facility: CLINIC | Age: 44
End: 2024-10-10

## 2024-10-14 ENCOUNTER — APPOINTMENT (OUTPATIENT)
Dept: RADIOLOGY | Facility: MEDICAL CENTER | Age: 44
End: 2024-10-14
Attending: EMERGENCY MEDICINE

## 2024-10-14 ENCOUNTER — HOSPITAL ENCOUNTER (EMERGENCY)
Facility: MEDICAL CENTER | Age: 44
End: 2024-10-15
Attending: EMERGENCY MEDICINE

## 2024-10-14 DIAGNOSIS — J18.9 COMMUNITY ACQUIRED PNEUMONIA OF LEFT LOWER LOBE OF LUNG: ICD-10-CM

## 2024-10-14 DIAGNOSIS — R73.9 HYPERGLYCEMIA: ICD-10-CM

## 2024-10-14 LAB
ALBUMIN SERPL BCP-MCNC: 3.4 G/DL (ref 3.2–4.9)
ALBUMIN/GLOB SERPL: 1.1 G/DL
ALP SERPL-CCNC: 76 U/L (ref 30–99)
ALT SERPL-CCNC: 13 U/L (ref 2–50)
ANION GAP SERPL CALC-SCNC: 10 MMOL/L (ref 7–16)
AST SERPL-CCNC: 15 U/L (ref 12–45)
BASOPHILS # BLD AUTO: 0.4 % (ref 0–1.8)
BASOPHILS # BLD: 0.03 K/UL (ref 0–0.12)
BILIRUB SERPL-MCNC: 0.2 MG/DL (ref 0.1–1.5)
BUN SERPL-MCNC: 14 MG/DL (ref 8–22)
CALCIUM ALBUM COR SERPL-MCNC: 9.5 MG/DL (ref 8.5–10.5)
CALCIUM SERPL-MCNC: 9 MG/DL (ref 8.5–10.5)
CHLORIDE SERPL-SCNC: 100 MMOL/L (ref 96–112)
CO2 SERPL-SCNC: 26 MMOL/L (ref 20–33)
CREAT SERPL-MCNC: 0.63 MG/DL (ref 0.5–1.4)
EKG IMPRESSION: NORMAL
EOSINOPHIL # BLD AUTO: 0.25 K/UL (ref 0–0.51)
EOSINOPHIL NFR BLD: 3.5 % (ref 0–6.9)
ERYTHROCYTE [DISTWIDTH] IN BLOOD BY AUTOMATED COUNT: 41.1 FL (ref 35.9–50)
FLUAV RNA SPEC QL NAA+PROBE: NEGATIVE
FLUBV RNA SPEC QL NAA+PROBE: NEGATIVE
GFR SERPLBLD CREATININE-BSD FMLA CKD-EPI: 112 ML/MIN/1.73 M 2
GLOBULIN SER CALC-MCNC: 3.2 G/DL (ref 1.9–3.5)
GLUCOSE SERPL-MCNC: 290 MG/DL (ref 65–99)
HCT VFR BLD AUTO: 38.3 % (ref 37–47)
HGB BLD-MCNC: 12.9 G/DL (ref 12–16)
IMM GRANULOCYTES # BLD AUTO: 0.03 K/UL (ref 0–0.11)
IMM GRANULOCYTES NFR BLD AUTO: 0.4 % (ref 0–0.9)
LYMPHOCYTES # BLD AUTO: 2.11 K/UL (ref 1–4.8)
LYMPHOCYTES NFR BLD: 29.2 % (ref 22–41)
MCH RBC QN AUTO: 26.9 PG (ref 27–33)
MCHC RBC AUTO-ENTMCNC: 33.7 G/DL (ref 32.2–35.5)
MCV RBC AUTO: 79.8 FL (ref 81.4–97.8)
MONOCYTES # BLD AUTO: 0.37 K/UL (ref 0–0.85)
MONOCYTES NFR BLD AUTO: 5.1 % (ref 0–13.4)
NEUTROPHILS # BLD AUTO: 4.43 K/UL (ref 1.82–7.42)
NEUTROPHILS NFR BLD: 61.4 % (ref 44–72)
NRBC # BLD AUTO: 0 K/UL
NRBC BLD-RTO: 0 /100 WBC (ref 0–0.2)
NT-PROBNP SERPL IA-MCNC: 284 PG/ML (ref 0–125)
PLATELET # BLD AUTO: 313 K/UL (ref 164–446)
PMV BLD AUTO: 10.6 FL (ref 9–12.9)
POTASSIUM SERPL-SCNC: 3.4 MMOL/L (ref 3.6–5.5)
PROT SERPL-MCNC: 6.6 G/DL (ref 6–8.2)
RBC # BLD AUTO: 4.8 M/UL (ref 4.2–5.4)
RSV RNA SPEC QL NAA+PROBE: NEGATIVE
SARS-COV-2 RNA RESP QL NAA+PROBE: NOTDETECTED
SODIUM SERPL-SCNC: 136 MMOL/L (ref 135–145)
TROPONIN T SERPL-MCNC: 12 NG/L (ref 6–19)
WBC # BLD AUTO: 7.2 K/UL (ref 4.8–10.8)

## 2024-10-14 PROCEDURE — 36415 COLL VENOUS BLD VENIPUNCTURE: CPT

## 2024-10-14 PROCEDURE — 85025 COMPLETE CBC W/AUTO DIFF WBC: CPT

## 2024-10-14 PROCEDURE — 80053 COMPREHEN METABOLIC PANEL: CPT

## 2024-10-14 PROCEDURE — A9270 NON-COVERED ITEM OR SERVICE: HCPCS | Performed by: EMERGENCY MEDICINE

## 2024-10-14 PROCEDURE — 81001 URINALYSIS AUTO W/SCOPE: CPT

## 2024-10-14 PROCEDURE — 83880 ASSAY OF NATRIURETIC PEPTIDE: CPT

## 2024-10-14 PROCEDURE — 99284 EMERGENCY DEPT VISIT MOD MDM: CPT

## 2024-10-14 PROCEDURE — 700102 HCHG RX REV CODE 250 W/ 637 OVERRIDE(OP): Performed by: EMERGENCY MEDICINE

## 2024-10-14 PROCEDURE — 84484 ASSAY OF TROPONIN QUANT: CPT

## 2024-10-14 PROCEDURE — 71045 X-RAY EXAM CHEST 1 VIEW: CPT

## 2024-10-14 PROCEDURE — 93005 ELECTROCARDIOGRAM TRACING: CPT | Performed by: EMERGENCY MEDICINE

## 2024-10-14 PROCEDURE — 0241U HCHG SARS-COV-2 COVID-19 NFCT DS RESP RNA 4 TRGT ED POC: CPT

## 2024-10-14 RX ORDER — ACETAMINOPHEN 500 MG
1000 TABLET ORAL ONCE
Status: COMPLETED | OUTPATIENT
Start: 2024-10-14 | End: 2024-10-14

## 2024-10-14 RX ORDER — LISINOPRIL 10 MG/1
10 TABLET ORAL ONCE
Status: COMPLETED | OUTPATIENT
Start: 2024-10-14 | End: 2024-10-14

## 2024-10-14 RX ADMIN — ACETAMINOPHEN 1000 MG: 500 TABLET ORAL at 23:28

## 2024-10-14 RX ADMIN — LISINOPRIL 10 MG: 10 TABLET ORAL at 23:28

## 2024-10-14 ASSESSMENT — FIBROSIS 4 INDEX: FIB4 SCORE: 0.54

## 2024-10-14 ASSESSMENT — PAIN DESCRIPTION - PAIN TYPE: TYPE: ACUTE PAIN

## 2024-10-15 ENCOUNTER — PHARMACY VISIT (OUTPATIENT)
Dept: PHARMACY | Facility: MEDICAL CENTER | Age: 44
End: 2024-10-15
Payer: COMMERCIAL

## 2024-10-15 VITALS
HEART RATE: 72 BPM | DIASTOLIC BLOOD PRESSURE: 74 MMHG | TEMPERATURE: 98.4 F | HEIGHT: 62 IN | BODY MASS INDEX: 29.66 KG/M2 | OXYGEN SATURATION: 98 % | SYSTOLIC BLOOD PRESSURE: 157 MMHG | RESPIRATION RATE: 20 BRPM | WEIGHT: 161.16 LBS

## 2024-10-15 LAB
APPEARANCE UR: CLEAR
BACTERIA #/AREA URNS HPF: ABNORMAL /HPF
BILIRUB UR QL STRIP.AUTO: NEGATIVE
COLOR UR: YELLOW
EPI CELLS #/AREA URNS HPF: NEGATIVE /HPF
GLUCOSE UR STRIP.AUTO-MCNC: >=1000 MG/DL
HYALINE CASTS #/AREA URNS LPF: ABNORMAL /LPF
KETONES UR STRIP.AUTO-MCNC: NEGATIVE MG/DL
LEUKOCYTE ESTERASE UR QL STRIP.AUTO: NEGATIVE
MICRO URNS: ABNORMAL
NITRITE UR QL STRIP.AUTO: NEGATIVE
PH UR STRIP.AUTO: 6.5 [PH] (ref 5–8)
PROT UR QL STRIP: 100 MG/DL
RBC # URNS HPF: ABNORMAL /HPF
RBC UR QL AUTO: ABNORMAL
SP GR UR STRIP.AUTO: 1.02
UROBILINOGEN UR STRIP.AUTO-MCNC: 0.2 MG/DL
WBC #/AREA URNS HPF: ABNORMAL /HPF

## 2024-10-15 PROCEDURE — RXMED WILLOW AMBULATORY MEDICATION CHARGE: Performed by: EMERGENCY MEDICINE

## 2024-10-15 RX ORDER — AZITHROMYCIN 250 MG/1
TABLET, FILM COATED ORAL
Qty: 6 TABLET | Refills: 0 | Status: ACTIVE | OUTPATIENT
Start: 2024-10-15

## 2024-10-23 ENCOUNTER — EH NON-PROVIDER (OUTPATIENT)
Dept: OCCUPATIONAL MEDICINE | Facility: CLINIC | Age: 44
End: 2024-10-23

## 2024-10-23 ENCOUNTER — HOSPITAL ENCOUNTER (OUTPATIENT)
Facility: MEDICAL CENTER | Age: 44
End: 2024-10-23
Attending: PREVENTIVE MEDICINE
Payer: COMMERCIAL

## 2024-10-23 DIAGNOSIS — Z23 NEED FOR VACCINATION: ICD-10-CM

## 2024-10-23 DIAGNOSIS — Z02.89 VISIT FOR OCCUPATIONAL HEALTH EXAMINATION: ICD-10-CM

## 2024-10-23 DIAGNOSIS — Z02.89 VISIT FOR OCCUPATIONAL HEALTH EXAMINATION: Primary | ICD-10-CM

## 2024-10-23 PROCEDURE — 86480 TB TEST CELL IMMUN MEASURE: CPT | Performed by: PREVENTIVE MEDICINE

## 2024-10-23 PROCEDURE — 90746 HEPB VACCINE 3 DOSE ADULT IM: CPT | Mod: JZ | Performed by: PREVENTIVE MEDICINE

## 2024-10-23 PROCEDURE — 90471 IMMUNIZATION ADMIN: CPT | Performed by: PREVENTIVE MEDICINE

## 2024-10-24 ENCOUNTER — APPOINTMENT (OUTPATIENT)
Dept: OCCUPATIONAL MEDICINE | Facility: CLINIC | Age: 44
End: 2024-10-24

## 2024-10-24 LAB
GAMMA INTERFERON BACKGROUND BLD IA-ACNC: 0.03 IU/ML
M TB IFN-G BLD-IMP: POSITIVE
M TB IFN-G CD4+ BCKGRND COR BLD-ACNC: 0.64 IU/ML
MITOGEN IGNF BCKGRD COR BLD-ACNC: >10 IU/ML
QFT TB2 - NIL TBQ2: 0.8 IU/ML

## 2024-10-30 ENCOUNTER — TELEPHONE (OUTPATIENT)
Dept: OCCUPATIONAL MEDICINE | Facility: CLINIC | Age: 44
End: 2024-10-30
Payer: COMMERCIAL

## 2024-11-13 ENCOUNTER — NON-PROVIDER VISIT (OUTPATIENT)
Dept: OCCUPATIONAL MEDICINE | Facility: CLINIC | Age: 44
End: 2024-11-13

## 2024-11-13 ENCOUNTER — APPOINTMENT (OUTPATIENT)
Dept: RADIOLOGY | Facility: IMAGING CENTER | Age: 44
End: 2024-11-13
Attending: PREVENTIVE MEDICINE

## 2024-11-13 DIAGNOSIS — Z02.89 ENCOUNTER FOR OCCUPATIONAL HEALTH EXAMINATION: Primary | ICD-10-CM

## 2024-11-13 DIAGNOSIS — Z02.89 ENCOUNTER FOR OCCUPATIONAL HEALTH EXAMINATION: ICD-10-CM

## 2024-11-13 PROCEDURE — 71045 X-RAY EXAM CHEST 1 VIEW: CPT | Mod: TC | Performed by: STUDENT IN AN ORGANIZED HEALTH CARE EDUCATION/TRAINING PROGRAM

## 2024-12-30 ENCOUNTER — HOSPITAL ENCOUNTER (EMERGENCY)
Facility: MEDICAL CENTER | Age: 44
End: 2024-12-31

## 2024-12-31 NOTE — ED NOTES
"Patient deciding to leave without being seen by Emergency Department Physician. Patient understands that they have a room assigned; patient does not wish to wait any longer and understands that they can return at anytime for care should their symptoms return or worsen. Patient is Alert and Oriented x4; No Signs of Distress are noted at this time; Respirations are even and unlabored.  Patient will be discharged as \"Left Without being seen\"   "

## 2025-02-23 ENCOUNTER — HOSPITAL ENCOUNTER (EMERGENCY)
Facility: MEDICAL CENTER | Age: 45
End: 2025-02-24
Attending: EMERGENCY MEDICINE
Payer: COMMERCIAL

## 2025-02-23 DIAGNOSIS — R11.2 NAUSEA AND VOMITING, UNSPECIFIED VOMITING TYPE: ICD-10-CM

## 2025-02-23 DIAGNOSIS — R51.9 ACUTE NONINTRACTABLE HEADACHE, UNSPECIFIED HEADACHE TYPE: ICD-10-CM

## 2025-02-23 LAB
ALBUMIN SERPL BCP-MCNC: 3.7 G/DL (ref 3.2–4.9)
ALBUMIN/GLOB SERPL: 1.2 G/DL
ALP SERPL-CCNC: 65 U/L (ref 30–99)
ALT SERPL-CCNC: 13 U/L (ref 2–50)
ANION GAP SERPL CALC-SCNC: 13 MMOL/L (ref 7–16)
AST SERPL-CCNC: 17 U/L (ref 12–45)
BASOPHILS # BLD AUTO: 0.5 % (ref 0–1.8)
BASOPHILS # BLD: 0.04 K/UL (ref 0–0.12)
BILIRUB SERPL-MCNC: <0.2 MG/DL (ref 0.1–1.5)
BUN SERPL-MCNC: 19 MG/DL (ref 8–22)
CALCIUM ALBUM COR SERPL-MCNC: 9.2 MG/DL (ref 8.5–10.5)
CALCIUM SERPL-MCNC: 9 MG/DL (ref 8.5–10.5)
CHLORIDE SERPL-SCNC: 104 MMOL/L (ref 96–112)
CO2 SERPL-SCNC: 21 MMOL/L (ref 20–33)
CREAT SERPL-MCNC: 1.12 MG/DL (ref 0.5–1.4)
EOSINOPHIL # BLD AUTO: 0.16 K/UL (ref 0–0.51)
EOSINOPHIL NFR BLD: 1.9 % (ref 0–6.9)
ERYTHROCYTE [DISTWIDTH] IN BLOOD BY AUTOMATED COUNT: 39.1 FL (ref 35.9–50)
GFR SERPLBLD CREATININE-BSD FMLA CKD-EPI: 62 ML/MIN/1.73 M 2
GLOBULIN SER CALC-MCNC: 3.1 G/DL (ref 1.9–3.5)
GLUCOSE BLD STRIP.AUTO-MCNC: 150 MG/DL (ref 65–99)
GLUCOSE SERPL-MCNC: 170 MG/DL (ref 65–99)
HCG SERPL QL: NEGATIVE
HCT VFR BLD AUTO: 36.7 % (ref 37–47)
HGB BLD-MCNC: 12.3 G/DL (ref 12–16)
IMM GRANULOCYTES # BLD AUTO: 0.03 K/UL (ref 0–0.11)
IMM GRANULOCYTES NFR BLD AUTO: 0.4 % (ref 0–0.9)
LIPASE SERPL-CCNC: 53 U/L (ref 11–82)
LYMPHOCYTES # BLD AUTO: 1.45 K/UL (ref 1–4.8)
LYMPHOCYTES NFR BLD: 17.2 % (ref 22–41)
MCH RBC QN AUTO: 28.5 PG (ref 27–33)
MCHC RBC AUTO-ENTMCNC: 33.5 G/DL (ref 32.2–35.5)
MCV RBC AUTO: 85.2 FL (ref 81.4–97.8)
MONOCYTES # BLD AUTO: 0.62 K/UL (ref 0–0.85)
MONOCYTES NFR BLD AUTO: 7.3 % (ref 0–13.4)
NEUTROPHILS # BLD AUTO: 6.14 K/UL (ref 1.82–7.42)
NEUTROPHILS NFR BLD: 72.7 % (ref 44–72)
NRBC # BLD AUTO: 0 K/UL
NRBC BLD-RTO: 0 /100 WBC (ref 0–0.2)
PLATELET # BLD AUTO: 369 K/UL (ref 164–446)
PMV BLD AUTO: 11.4 FL (ref 9–12.9)
POTASSIUM SERPL-SCNC: 3.8 MMOL/L (ref 3.6–5.5)
PROT SERPL-MCNC: 6.8 G/DL (ref 6–8.2)
RBC # BLD AUTO: 4.31 M/UL (ref 4.2–5.4)
SODIUM SERPL-SCNC: 138 MMOL/L (ref 135–145)
WBC # BLD AUTO: 8.4 K/UL (ref 4.8–10.8)

## 2025-02-23 PROCEDURE — 81001 URINALYSIS AUTO W/SCOPE: CPT

## 2025-02-23 PROCEDURE — 36415 COLL VENOUS BLD VENIPUNCTURE: CPT

## 2025-02-23 PROCEDURE — 99284 EMERGENCY DEPT VISIT MOD MDM: CPT

## 2025-02-23 PROCEDURE — 82962 GLUCOSE BLOOD TEST: CPT

## 2025-02-23 PROCEDURE — 80053 COMPREHEN METABOLIC PANEL: CPT

## 2025-02-23 PROCEDURE — 83690 ASSAY OF LIPASE: CPT

## 2025-02-23 PROCEDURE — 700111 HCHG RX REV CODE 636 W/ 250 OVERRIDE (IP): Mod: JZ | Performed by: EMERGENCY MEDICINE

## 2025-02-23 PROCEDURE — 85025 COMPLETE CBC W/AUTO DIFF WBC: CPT

## 2025-02-23 PROCEDURE — 96374 THER/PROPH/DIAG INJ IV PUSH: CPT

## 2025-02-23 PROCEDURE — 84703 CHORIONIC GONADOTROPIN ASSAY: CPT

## 2025-02-23 PROCEDURE — 96375 TX/PRO/DX INJ NEW DRUG ADDON: CPT

## 2025-02-23 RX ORDER — METOCLOPRAMIDE HYDROCHLORIDE 5 MG/ML
5 INJECTION INTRAMUSCULAR; INTRAVENOUS ONCE
Status: COMPLETED | OUTPATIENT
Start: 2025-02-23 | End: 2025-02-23

## 2025-02-23 RX ORDER — DIPHENHYDRAMINE HYDROCHLORIDE 50 MG/ML
25 INJECTION, SOLUTION INTRAMUSCULAR; INTRAVENOUS ONCE
Status: COMPLETED | OUTPATIENT
Start: 2025-02-23 | End: 2025-02-23

## 2025-02-23 RX ADMIN — METOCLOPRAMIDE HYDROCHLORIDE 5 MG: 5 INJECTION INTRAMUSCULAR; INTRAVENOUS at 23:31

## 2025-02-23 RX ADMIN — DIPHENHYDRAMINE HYDROCHLORIDE 25 MG: 50 INJECTION, SOLUTION INTRAMUSCULAR; INTRAVENOUS at 23:31

## 2025-02-23 ASSESSMENT — FIBROSIS 4 INDEX: FIB4 SCORE: 0.58

## 2025-02-24 ENCOUNTER — PHARMACY VISIT (OUTPATIENT)
Dept: PHARMACY | Facility: MEDICAL CENTER | Age: 45
End: 2025-02-24
Payer: COMMERCIAL

## 2025-02-24 VITALS
HEIGHT: 62 IN | HEART RATE: 85 BPM | TEMPERATURE: 96.5 F | BODY MASS INDEX: 28.76 KG/M2 | WEIGHT: 156.31 LBS | DIASTOLIC BLOOD PRESSURE: 74 MMHG | RESPIRATION RATE: 16 BRPM | OXYGEN SATURATION: 99 % | SYSTOLIC BLOOD PRESSURE: 133 MMHG

## 2025-02-24 LAB
APPEARANCE UR: ABNORMAL
BACTERIA #/AREA URNS HPF: ABNORMAL /HPF
BILIRUB UR QL STRIP.AUTO: NEGATIVE
CASTS URNS QL MICRO: ABNORMAL /LPF (ref 0–2)
COLOR UR: YELLOW
EPITHELIAL CELLS 1715: ABNORMAL /HPF (ref 0–5)
FLUAV RNA SPEC QL NAA+PROBE: NEGATIVE
FLUBV RNA SPEC QL NAA+PROBE: NEGATIVE
GLUCOSE UR STRIP.AUTO-MCNC: >=1000 MG/DL
HYALINE CAST   1831: PRESENT /LPF
KETONES UR STRIP.AUTO-MCNC: ABNORMAL MG/DL
LEUKOCYTE ESTERASE UR QL STRIP.AUTO: NEGATIVE
MICRO URNS: ABNORMAL
NITRITE UR QL STRIP.AUTO: NEGATIVE
PH UR STRIP.AUTO: 5 [PH] (ref 5–8)
PROT UR QL STRIP: 300 MG/DL
RBC # URNS HPF: ABNORMAL /HPF (ref 0–2)
RBC UR QL AUTO: ABNORMAL
RSV RNA SPEC QL NAA+PROBE: NEGATIVE
SARS-COV-2 RNA RESP QL NAA+PROBE: NOTDETECTED
SP GR UR STRIP.AUTO: 1.02
UROBILINOGEN UR STRIP.AUTO-MCNC: 1 EU/DL
WBC #/AREA URNS HPF: ABNORMAL /HPF

## 2025-02-24 PROCEDURE — 0241U HCHG SARS-COV-2 COVID-19 NFCT DS RESP RNA 4 TRGT ED POC: CPT

## 2025-02-24 PROCEDURE — RXMED WILLOW AMBULATORY MEDICATION CHARGE: Performed by: EMERGENCY MEDICINE

## 2025-02-24 RX ORDER — ONDANSETRON 4 MG/1
4 TABLET, ORALLY DISINTEGRATING ORAL EVERY 6 HOURS PRN
Qty: 5 TABLET | Refills: 0 | Status: SHIPPED | OUTPATIENT
Start: 2025-02-24

## 2025-02-24 NOTE — ED PROVIDER NOTES
"                                                        ED Provider Note    CHIEF COMPLAINT  Chief Complaint   Patient presents with    Headache     Patient complaining of a headache since Friday. Patient endorses dizziness with the headache and associated nausea and vomiting. States she has been unable to keep food down since Friday. Patient hx Diabetes        HPI    Primary care provider: Pcp Pt States None   History obtained from: Patient  History limited by: None     Bettina Burdick is a 44 y.o. female who presents to the ED with  complaining of headache for \"couple of months.\"  She reports that the pain usually starts in the back and then radiates to the front.  She reports associated nausea leading to vomiting as well as dizziness and also slight light sensitivity.  Patient denies fever.  She reports slight runny nose, congestion and cough.  She denies diarrhea or dysuria.  She denies possibility of pregnancy.  No visual change/focal weakness or sensory change.  Patient states that she has been unable to keep anything down for the past 2 days because of her nausea and vomiting and she has history of diabetes.    REVIEW OF SYSTEMS  Please see HPI for pertinent positives/negatives.  All other systems reviewed and are negative.     PAST MEDICAL HISTORY  Past Medical History:   Diagnosis Date    DIABETES MELLITUS 1/13/2012    Diabetes     Infectious disease     PID        SURGICAL HISTORY  Past Surgical History:   Procedure Laterality Date    CHOLECYSTECTOMY      OTHER      OTHER ABDOMINAL SURGERY      ned 2005        SOCIAL HISTORY  Social History     Tobacco Use    Smoking status: Never    Smokeless tobacco: Never   Vaping Use    Vaping status: Never Used   Substance and Sexual Activity    Alcohol use: Yes     Comment: occ    Drug use: No    Sexual activity: Yes     Birth control/protection: I.U.D.        FAMILY HISTORY  Family History   Problem Relation Age of Onset    Diabetes Mother     " "Diabetes Maternal Grandmother     Diabetes Maternal Grandfather     Heart Disease Neg Hx     Hypertension Neg Hx     Hyperlipidemia Neg Hx     Stroke Neg Hx     Cancer Neg Hx         CURRENT MEDICATIONS  Home Medications       Reviewed by Salvador Neville R.N. (Registered Nurse) on 02/23/25 at 2129  Med List Status: Not Addressed     Medication Last Dose Status   azithromycin (ZITHROMAX) 250 MG Tab  Active   glyBURIDE (DIABETA) 5 MG Tab  Active   ibuprofen (MOTRIN) 600 MG Tab  Active   lisinopril (PRINIVIL) 10 MG Tab  Active   metformin (GLUCOPHAGE) 1000 MG tablet  Active                     ALLERGIES  Allergies   Allergen Reactions    Septra [Sulfamethoxazole W-Trimethoprim] Rash     Body rash        PHYSICAL EXAM  VITAL SIGNS: /74   Pulse 85   Temp 35.8 °C (96.5 °F) (Temporal)   Resp 16   Ht 1.575 m (5' 2\")   Wt 70.9 kg (156 lb 4.9 oz)   SpO2 99%   BMI 28.59 kg/m²  @ASHWINI[[id number]::@     Pulse ox interpretation: 100% I interpret this pulse ox as normal     Constitutional: Well developed, well nourished, alert in no apparent distress, nontoxic appearance    HENT: No external signs of trauma, normocephalic, oropharynx moist and clear   Eyes: PERRL, EOMI, vision and visual fields are grossly intact bilaterally, conjunctiva without erythema, no discharge, no icterus    Neck: Soft and supple, trachea midline, no stridor, no tenderness, no LAD, good ROM    Cardiovascular: Regular rate and rhythm, no murmurs/rubs/gallops, strong distal pulses and good perfusion    Thorax & Lungs: No respiratory distress, CTAB    Abdomen: Soft, nontender, nondistended, no guarding, no rebound, normal BS    Back: No CVAT     Extremities: No cyanosis, no edema, no gross deformity, good ROM, intact distal pulses with brisk cap refill    Skin: Warm, dry, no pallor/cyanosis, no rash noted      Neuro: Alert and oriented to person, place, and time.  GCS 15.  CN II-XII grossly intact.  Normal speech.  Equal strength bilateral UE/LE.  " Sensation intact to touch.  No cerebellar signs.  Normal gait.    Psychiatric: Cooperative, normal mood and affect, normal judgement, appropriate for clinical situation        DIAGNOSTIC STUDIES / PROCEDURES        LABS  All labs reviewed by me.     Results for orders placed or performed during the hospital encounter of 02/23/25   POCT glucose device results    Collection Time: 02/23/25  9:31 PM   Result Value Ref Range    POC Glucose, Blood 150 (H) 65 - 99 mg/dL   CBC with Differential    Collection Time: 02/23/25  9:46 PM   Result Value Ref Range    WBC 8.4 4.8 - 10.8 K/uL    RBC 4.31 4.20 - 5.40 M/uL    Hemoglobin 12.3 12.0 - 16.0 g/dL    Hematocrit 36.7 (L) 37.0 - 47.0 %    MCV 85.2 81.4 - 97.8 fL    MCH 28.5 27.0 - 33.0 pg    MCHC 33.5 32.2 - 35.5 g/dL    RDW 39.1 35.9 - 50.0 fL    Platelet Count 369 164 - 446 K/uL    MPV 11.4 9.0 - 12.9 fL    Neutrophils-Polys 72.70 (H) 44.00 - 72.00 %    Lymphocytes 17.20 (L) 22.00 - 41.00 %    Monocytes 7.30 0.00 - 13.40 %    Eosinophils 1.90 0.00 - 6.90 %    Basophils 0.50 0.00 - 1.80 %    Immature Granulocytes 0.40 0.00 - 0.90 %    Nucleated RBC 0.00 0.00 - 0.20 /100 WBC    Neutrophils (Absolute) 6.14 1.82 - 7.42 K/uL    Lymphs (Absolute) 1.45 1.00 - 4.80 K/uL    Monos (Absolute) 0.62 0.00 - 0.85 K/uL    Eos (Absolute) 0.16 0.00 - 0.51 K/uL    Baso (Absolute) 0.04 0.00 - 0.12 K/uL    Immature Granulocytes (abs) 0.03 0.00 - 0.11 K/uL    NRBC (Absolute) 0.00 K/uL   Comp Metabolic Panel    Collection Time: 02/23/25  9:46 PM   Result Value Ref Range    Sodium 138 135 - 145 mmol/L    Potassium 3.8 3.6 - 5.5 mmol/L    Chloride 104 96 - 112 mmol/L    Co2 21 20 - 33 mmol/L    Anion Gap 13.0 7.0 - 16.0    Glucose 170 (H) 65 - 99 mg/dL    Bun 19 8 - 22 mg/dL    Creatinine 1.12 0.50 - 1.40 mg/dL    Calcium 9.0 8.5 - 10.5 mg/dL    Correct Calcium 9.2 8.5 - 10.5 mg/dL    AST(SGOT) 17 12 - 45 U/L    ALT(SGPT) 13 2 - 50 U/L    Alkaline Phosphatase 65 30 - 99 U/L    Total Bilirubin <0.2  0.1 - 1.5 mg/dL    Albumin 3.7 3.2 - 4.9 g/dL    Total Protein 6.8 6.0 - 8.2 g/dL    Globulin 3.1 1.9 - 3.5 g/dL    A-G Ratio 1.2 g/dL   ESTIMATED GFR    Collection Time: 02/23/25  9:46 PM   Result Value Ref Range    GFR (CKD-EPI) 62 >60 mL/min/1.73 m 2   LIPASE    Collection Time: 02/23/25  9:46 PM   Result Value Ref Range    Lipase 53 11 - 82 U/L   HCG QUAL SERUM    Collection Time: 02/23/25  9:46 PM   Result Value Ref Range    Beta-Hcg Qualitative Serum Negative Negative   Urinalysis    Collection Time: 02/23/25 11:37 PM    Specimen: Urine   Result Value Ref Range    Color Yellow     Character Cloudy (A)     Specific Gravity 1.023 <1.035    Ph 5.0 5.0 - 8.0    Glucose >=1000 (A) Negative mg/dL    Ketones Trace (A) Negative mg/dL    Protein 300 (A) Negative mg/dL    Bilirubin Negative Negative    Urobilinogen, Urine 1.0 <=1.0 EU/dL    Nitrite Negative Negative    Leukocyte Esterase Negative Negative    Occult Blood Large (A) Negative    Micro Urine Req Microscopic    URINE MICROSCOPIC (W/UA)    Collection Time: 02/23/25 11:37 PM   Result Value Ref Range    WBC 0-2 /hpf    RBC 21-50 (A) 0 - 2 /hpf    Bacteria Few (A) None /hpf    Epithelial Cells 0-2 0 - 5 /hpf    Urine Casts 6-10 (A) 0 - 2 /lpf    Hyaline Cast Present /lpf   POC CoV-2, FLU A/B, RSV by PCR    Collection Time: 02/24/25 12:00 AM   Result Value Ref Range    POC Influenza A RNA, PCR Negative Negative    POC Influenza B RNA, PCR Negative Negative    POC RSV, by PCR Negative Negative    POC SARS-CoV-2, PCR NotDetected NotDetected        RADIOLOGY  I have independently interpreted the diagnostic imaging associated with this visit and am waiting the final reading from the radiologist.     No orders to display          COURSE & MEDICAL DECISION MAKING  Nursing notes, VS, PMSFHx reviewed in chart.     Review of past medical records shows the patient last came to this ED December 30, 2024 but left before being seen by physician.  Patient had outpatient  occupational health visit on November 13, 2024.  MRI brain on September 30, 2020 had the following findings:    MRI of the brain without contrast within normal limits.       Differential diagnoses considered include but are not limited to: Tension/migraine/cluster HA, intracranial hemorrhage/SAH, aneurysm, dissection, intracranial HTN, central venous thrombosis, viral syndrome, sinusitis, tumor/cancer, dehydration, electrolyte derangement, DKA       ED Observation Status? No; Patient does not meet criteria for ED Observation.       Discussion of management with other QHP or appropriate source(s): None     Escalation of care considered, and ultimately not performed: diagnostic imaging.     Decision tools and prescription drugs considered including, but not limited to: Pain Medications   .        History and physical exam as above.  This is a 44-year-old female patient with medical history including diabetes who presents to the ED with above complaints.  Initial exam benign.  Patient without focal neurological findings or concerning features to suggest need for emergent imaging or LP.  Record review also shows that patient had unremarkable CT head and MRI brain in September 2020.  Laboratory testing today shows hyperglycemia without evidence for DKA.  No electrolyte derangement.  No significant renal or hepatic dysfunction.  No leukocytosis and patient afebrile in the ED.  Findings on the UA are likely combination of her diabetes and mild dehydration.  Influenza/RSV/COVID testing returned negative and pregnancy test is negative as well.  Patient was treated with Reglan and Benadryl and closely monitored in the ED and remained stable.  She tolerated oral intake without difficulty.  I discussed the findings with patient.  On recheck, she is noted to be in no acute distress and nontoxic in appearance.  At this time, I have low clinical suspicion for emergent pathology such as CVA, intracranial bleed, dissection, venous  thrombosis, meningitis.  Patient was advised on supportive home care, outpatient follow-up and return to ED precautions.  She will be prescribed Zofran to use as needed.  Patient verbalized understanding and agreed with plan of care with no further questions or concerns.      The patient is referred to a primary physician for blood pressure management, diabetic screening, and for all other preventative health concerns.       FINAL IMPRESSION  1. Acute nonintractable headache, unspecified headache type Acute   2. Nausea and vomiting, unspecified vomiting type Acute          DISPOSITION  Patient will be discharged home in stable condition.       FOLLOW UP  Tiny Mcmillan P.A.-C.  [address] [place] [id number]  [phone]    Call today      [facility], Emergency Dept  [facility] [facility] [address] [phone]    If symptoms worsen         OUTPATIENT MEDICATIONS  Discharge Medication List as of 2/24/2025  1:01 AM        START taking these medications    Details   ondansetron (ZOFRAN ODT) 4 MG TABLET DISPERSIBLE Take 1 Tablet by mouth every 6 hours as needed for Nausea/Vomiting., Disp-5 Tablet, R-0, Normal                Electronically signed by: Jaden Greenfield D.O., 2/23/2025 10:30 PM      Portions of this record were made with voice recognition software.  Despite my review, errors may remain.  Please interpret this chart in the appropriate context.

## 2025-02-24 NOTE — ED TRIAGE NOTES
"Chief Complaint   Patient presents with    Headache     Patient complaining of a headache since Friday. Patient endorses dizziness with the headache and associated nausea and vomiting. States she has been unable to keep food down since Friday. Patient hx Diabetes       Pt is alert and oriented, speaking in full sentences, follows commands and responds appropriately to questions. Resperations are even and unlabored.      Pt placed in lobby. Pt educated on triage process. Pt encouraged to alert staff for any changes.     Patient and staff wearing appropriate PPE.    /80   Pulse 96   Temp 35.8 °C (96.5 °F) (Temporal)   Resp 16   Ht 1.575 m (5' 2\")   Wt 70.9 kg (156 lb 4.9 oz)   SpO2 100%    "

## 2025-05-07 ENCOUNTER — APPOINTMENT (OUTPATIENT)
Dept: RADIOLOGY | Facility: MEDICAL CENTER | Age: 45
End: 2025-05-07
Attending: EMERGENCY MEDICINE

## 2025-05-07 ENCOUNTER — HOSPITAL ENCOUNTER (EMERGENCY)
Facility: MEDICAL CENTER | Age: 45
End: 2025-05-07
Attending: EMERGENCY MEDICINE

## 2025-05-07 VITALS
RESPIRATION RATE: 16 BRPM | TEMPERATURE: 98.2 F | HEART RATE: 83 BPM | OXYGEN SATURATION: 98 % | BODY MASS INDEX: 27.55 KG/M2 | DIASTOLIC BLOOD PRESSURE: 69 MMHG | WEIGHT: 149.69 LBS | SYSTOLIC BLOOD PRESSURE: 110 MMHG | HEIGHT: 62 IN

## 2025-05-07 DIAGNOSIS — M79.10 MYALGIA: ICD-10-CM

## 2025-05-07 DIAGNOSIS — R07.89 CHEST WALL PAIN: ICD-10-CM

## 2025-05-07 DIAGNOSIS — S29.019A THORACIC MYOFASCIAL STRAIN, INITIAL ENCOUNTER: ICD-10-CM

## 2025-05-07 DIAGNOSIS — S16.1XXA STRAIN OF NECK MUSCLE, INITIAL ENCOUNTER: ICD-10-CM
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
## 2025-05-07 PROCEDURE — 71046 X-RAY EXAM CHEST 2 VIEWS: CPT

## 2025-05-07 PROCEDURE — A9270 NON-COVERED ITEM OR SERVICE: HCPCS | Performed by: EMERGENCY MEDICINE

## 2025-05-07 PROCEDURE — 72070 X-RAY EXAM THORAC SPINE 2VWS: CPT

## 2025-05-07 PROCEDURE — 72040 X-RAY EXAM NECK SPINE 2-3 VW: CPT

## 2025-05-07 PROCEDURE — 700102 HCHG RX REV CODE 250 W/ 637 OVERRIDE(OP): Performed by: EMERGENCY MEDICINE

## 2025-05-07 PROCEDURE — 99284 EMERGENCY DEPT VISIT MOD MDM: CPT

## 2025-05-07 PROCEDURE — 72170 X-RAY EXAM OF PELVIS: CPT

## 2025-05-07 RX ORDER — METHOCARBAMOL 750 MG/1
750 TABLET, FILM COATED ORAL 4 TIMES DAILY
Qty: 40 TABLET | Refills: 0 | Status: SHIPPED | OUTPATIENT
Start: 2025-05-07 | End: 2025-05-17

## 2025-05-07 RX ORDER — METHOCARBAMOL 500 MG/1
1000 TABLET, FILM COATED ORAL ONCE
Status: COMPLETED | OUTPATIENT
Start: 2025-05-07 | End: 2025-05-07

## 2025-05-07 RX ORDER — ACETAMINOPHEN 500 MG
1000 TABLET ORAL ONCE
Status: COMPLETED | OUTPATIENT
Start: 2025-05-07 | End: 2025-05-07

## 2025-05-07 RX ORDER — NAPROXEN 500 MG/1
500 TABLET ORAL 2 TIMES DAILY WITH MEALS
Qty: 20 TABLET | Refills: 0 | Status: SHIPPED | OUTPATIENT
Start: 2025-05-07 | End: 2025-05-17

## 2025-05-07 RX ORDER — NAPROXEN 500 MG/1
500 TABLET ORAL ONCE
Status: COMPLETED | OUTPATIENT
Start: 2025-05-07 | End: 2025-05-07

## 2025-05-07 RX ADMIN — ACETAMINOPHEN 1000 MG: 500 TABLET ORAL at 17:56

## 2025-05-07 RX ADMIN — NAPROXEN 500 MG: 500 TABLET ORAL at 17:57

## 2025-05-07 RX ADMIN — METHOCARBAMOL 1000 MG: 500 TABLET ORAL at 17:57

## 2025-05-07 ASSESSMENT — FIBROSIS 4 INDEX: FIB4 SCORE: 0.56
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
## 2025-05-08 NOTE — ED NOTES
Pt ready for discharge, instructions given, verbalizes understanding  Pt given directions to the pharmacy.

## 2025-08-06 ENCOUNTER — HOSPITAL ENCOUNTER (OUTPATIENT)
Facility: MEDICAL CENTER | Age: 45
End: 2025-08-06
Attending: NURSE PRACTITIONER
Payer: COMMERCIAL

## 2025-08-06 ENCOUNTER — NON-PROVIDER VISIT (OUTPATIENT)
Dept: OCCUPATIONAL MEDICINE | Facility: CLINIC | Age: 45
End: 2025-08-06

## 2025-08-06 ENCOUNTER — OFFICE VISIT (OUTPATIENT)
Dept: OCCUPATIONAL MEDICINE | Facility: CLINIC | Age: 45
End: 2025-08-06

## 2025-08-06 DIAGNOSIS — Z02.1 PRE-EMPLOYMENT DRUG SCREENING: Primary | ICD-10-CM

## 2025-08-06 DIAGNOSIS — Z02.1 PRE-EMPLOYMENT DRUG SCREENING: ICD-10-CM

## 2025-08-06 DIAGNOSIS — Z02.1 PRE-EMPLOYMENT HEALTH SCREENING EXAMINATION: Primary | ICD-10-CM

## 2025-08-06 LAB
AMP AMPHETAMINE: NORMAL
BAR BARBITURATES: NORMAL
BZO BENZODIAZEPINES: NORMAL
COC COCAINE: NORMAL
INT CON NEG: NORMAL
INT CON POS: NORMAL
MDMA ECSTASY: NORMAL
MET METHAMPHETAMINES: NORMAL
MTD METHADONE: NORMAL
OPI OPIATES: NORMAL
OXY OXYCODONE: NORMAL
PCP PHENCYCLIDINE: NORMAL
POC URINE DRUG SCREEN OCDRS: NEGATIVE
THC: NORMAL

## 2025-08-06 PROCEDURE — 8915 PR COMPREHENSIVE PHYSICAL: Performed by: NURSE PRACTITIONER

## 2025-08-06 PROCEDURE — 80305 DRUG TEST PRSMV DIR OPT OBS: CPT | Performed by: NURSE PRACTITIONER

## 2025-08-06 PROCEDURE — 90746 HEPB VACCINE 3 DOSE ADULT IM: CPT | Mod: JZ | Performed by: NURSE PRACTITIONER

## 2025-08-06 PROCEDURE — 86480 TB TEST CELL IMMUN MEASURE: CPT | Performed by: NURSE PRACTITIONER

## 2025-08-08 LAB
GAMMA INTERFERON BACKGROUND BLD IA-ACNC: 0.02 IU/ML
M TB IFN-G BLD-IMP: NEGATIVE
M TB IFN-G CD4+ BCKGRND COR BLD-ACNC: 0.03 IU/ML
MITOGEN IGNF BCKGRD COR BLD-ACNC: 8.49 IU/ML
QFT TB2 - NIL TBQ2: 0.04 IU/ML